# Patient Record
Sex: FEMALE | NOT HISPANIC OR LATINO | Employment: FULL TIME | URBAN - METROPOLITAN AREA
[De-identification: names, ages, dates, MRNs, and addresses within clinical notes are randomized per-mention and may not be internally consistent; named-entity substitution may affect disease eponyms.]

---

## 2018-03-19 ENCOUNTER — OFFICE VISIT (OUTPATIENT)
Dept: FAMILY MEDICINE CLINIC | Facility: CLINIC | Age: 28
End: 2018-03-19

## 2018-03-19 VITALS
OXYGEN SATURATION: 97 % | SYSTOLIC BLOOD PRESSURE: 136 MMHG | TEMPERATURE: 98.4 F | HEART RATE: 85 BPM | WEIGHT: 198 LBS | DIASTOLIC BLOOD PRESSURE: 70 MMHG

## 2018-03-19 DIAGNOSIS — F32.2 SEVERE SINGLE CURRENT EPISODE OF MAJOR DEPRESSIVE DISORDER, WITHOUT PSYCHOTIC FEATURES (HCC): Primary | ICD-10-CM

## 2018-03-19 DIAGNOSIS — R53.83 OTHER FATIGUE: ICD-10-CM

## 2018-03-19 DIAGNOSIS — N39.46 MIXED STRESS AND URGE URINARY INCONTINENCE: ICD-10-CM

## 2018-03-19 LAB
HCT VFR BLDA CALC: 39.7 %
HGB BLDA-MCNC: 13.4 G/DL
MCH, POC: 31.1
MCHC, POC: 33.8
MCV, POC: 91.9
PLATELET # BLD AUTO: 241 10*3/MM3
PMV BLD: 7.8 FL
RBC, POC: 4.32
RDW, POC: 12.4
WBC # BLD: 11 10*3/UL

## 2018-03-19 PROCEDURE — 85027 COMPLETE CBC AUTOMATED: CPT | Performed by: FAMILY MEDICINE

## 2018-03-19 PROCEDURE — 99203 OFFICE O/P NEW LOW 30 MIN: CPT | Performed by: FAMILY MEDICINE

## 2018-03-19 RX ORDER — BUPROPION HYDROCHLORIDE 150 MG/1
150 TABLET ORAL DAILY
Qty: 30 TABLET | Refills: 5 | Status: SHIPPED | OUTPATIENT
Start: 2018-03-19 | End: 2018-09-07

## 2018-03-19 RX ORDER — NORGESTIMATE AND ETHINYL ESTRADIOL 7DAYSX3 LO
1 KIT ORAL DAILY
Qty: 28 TABLET | Refills: 12
Start: 2018-03-19 | End: 2018-10-17 | Stop reason: SDUPTHER

## 2018-03-19 RX ORDER — OXYBUTYNIN CHLORIDE 5 MG/1
5 TABLET, EXTENDED RELEASE ORAL DAILY
Qty: 30 TABLET | Refills: 5 | Status: SHIPPED | OUTPATIENT
Start: 2018-03-19 | End: 2018-09-07

## 2018-03-19 NOTE — PROGRESS NOTES
Subjective   Crystal Anthony is a 27 y.o. female. Presents today for   Chief Complaint   Patient presents with   • Establish Care     Having some depression issues and a weak bladder.  Has a low sex drive.  Has some whelping from blood pressure cuff.  Gets alot of ear infections.   New patient here to establish care.    Difficulty Urinating   This is a chronic problem. The current episode started more than 1 year ago. The problem occurs intermittently. The problem has been unchanged. Associated symptoms include fatigue and urinary symptoms. Pertinent negatives include no fever. Associated symptoms comments: Reports had a lot of UTIs when young;  Has cut out soda;  +coffee;  Reports has leakage bending over, cough, sneeze usually;  Occly has urge to go, but cannot quite make it in time.. Exacerbated by: cough, sneeze; Treatments tried: Has been doing kegel exercises for quite sometime, no relief. The treatment provided no relief.   Depression   Visit Type: initial  Onset of symptoms: more than 1 year ago  Progression since onset: gradually worsening  Patient presents with the following symptoms: anhedonia, depressed mood, excessive worry, feelings of hopelessness, feelings of worthlessness, impotence, irritability, memory impairment, panic, shortness of breath and thoughts of death.  Patient is not experiencing: chest pain, decreased concentration, hypersomnia, insomnia, muscle tension, suicidal ideas and suicidal planning.  Frequency of symptoms: constantly   Sleep quality: fair  Nighttime awakenings: none  Patient has a history of: depression  Treatment tried: SSRI (during and after pregnancy)  Compliance with treatment: good  Improvement on treatment: no relief  Compliance problems: no issues with, provider left practice and so off (was gyn).          Review of Systems   Constitutional: Positive for fatigue and irritability. Negative for fever.   Respiratory: Positive for shortness of breath.    Genitourinary:  Positive for difficulty urinating and impotence.   Psychiatric/Behavioral: Negative for decreased concentration and suicidal ideas. The patient does not have insomnia.        The following portions of the patient's history were reviewed and updated as appropriate: allergies, current medications, past family history, past medical history, past social history, past surgical history and problem list.    There is no problem list on file for this patient.  History reviewed. No pertinent past medical history.  Past Surgical History:   Procedure Laterality Date   • DILATATION AND CURETTAGE       Family History   Problem Relation Age of Onset   • Colon cancer Maternal Grandfather    • Diabetes type II Paternal Grandfather    • Diabetes type II Other      Great grandma maternal     Social History     Social History   • Marital status:      Social History Main Topics   • Smoking status: Never Smoker   • Smokeless tobacco: Never Used   • Alcohol use Yes      Comment: weekends   • Drug use: Unknown     Other Topics Concern   • Not on file         No Known Allergies    No current outpatient prescriptions on file prior to visit.     No current facility-administered medications on file prior to visit.        Objective   Vitals:    03/19/18 1626   BP: 136/70   Pulse: 85   Temp: 98.4 °F (36.9 °C)   SpO2: 97%   Weight: 89.8 kg (198 lb)       Physical Exam   Constitutional: She appears well-developed and well-nourished.   HENT:   Head: Normocephalic and atraumatic.   Neck: Neck supple. No JVD present. No thyromegaly present.   Cardiovascular: Normal rate, regular rhythm and normal heart sounds.  Exam reveals no gallop and no friction rub.    No murmur heard.  Pulmonary/Chest: Effort normal and breath sounds normal. No respiratory distress. She has no wheezes. She has no rales.   Abdominal: Soft. Bowel sounds are normal. She exhibits no distension. There is no tenderness. There is no rebound and no guarding.   Musculoskeletal:  She exhibits no edema.   Neurological: She is alert.   Skin: Skin is warm and dry.   Psychiatric: Her behavior is normal. She exhibits a depressed mood.   Nursing note and vitals reviewed.  CBC ordered and reviewed.    POC CBC   Order: [id number]   Status:  Final result   Visible to patient:  No (Not Released) Dx:  Other fatigue    Ref Range & Units 17:22   Hematocrit % 39.7    Hemoglobin g/dL 13.4    RBC  4.32    WBC  11.0    MCV  91.9    MCH  31.1    MCHC  33.8    RDW-CV  12.4    Platelets 10*3/mm3 241    MPV  7.8    Resulting Agency  [facility]      Specimen Collected: 03/19/18 17:22 Last Resulted: 03/19/18 17:22                Assessment/Plan   Crystal was seen today for establish care.    Diagnoses and all orders for this visit:    Severe single current episode of major depressive disorder, without psychotic features  -     buPROPion XL (WELLBUTRIN XL) 150 MG 24 hr tablet; Take 1 tablet by mouth Daily.    Mixed stress and urge urinary incontinence  -     Ambulatory Referral to Urology  -     oxybutynin XL (DITROPAN XL) 5 MG 24 hr tablet; Take 1 tablet by mouth Daily.    Other fatigue  -     buPROPion XL (WELLBUTRIN XL) 150 MG 24 hr tablet; Take 1 tablet by mouth Daily.  -     Comprehensive Metabolic Panel  -     T3, Free  -     T4, Free  -     TSH  -     Vitamin B12  -     POC CBC    Other orders  -     norgestimate-ethinyl estradiol (ORTHO TRI-CYCLEN LO) 0.18/0.215/0.25 MG-25 MCG per tablet; Take 1 tablet by mouth Daily.    -mixed urinary incontinence, encouraged to continue kegel exercises;  Suggested PT referral, but declines at this time.  Would like to see urology and will make referral to evaluate further.  In interim will start ditropan xl low dose as trial;  D/w may help with urge, but not necessarily stress incontinence.  -patient depressed, d/w options and will try wellbutrin as weight neutral and less likely libido side effects.  Sertraline did not put in remission.  D/w  trintellix and could consider adding as well.  -fatigue - will check labs as above.         -Follow up: 6 weeks     No current outpatient prescriptions on file.

## 2018-03-20 DIAGNOSIS — E03.9 HYPOTHYROIDISM, UNSPECIFIED TYPE: Primary | ICD-10-CM

## 2018-03-20 DIAGNOSIS — E03.8 OTHER SPECIFIED HYPOTHYROIDISM: Primary | ICD-10-CM

## 2018-03-20 DIAGNOSIS — R53.83 OTHER FATIGUE: ICD-10-CM

## 2018-03-20 LAB
ALBUMIN SERPL-MCNC: 4.2 G/DL (ref 3.5–5.5)
ALBUMIN/GLOB SERPL: 1.4 {RATIO} (ref 1.2–2.2)
ALP SERPL-CCNC: 53 IU/L (ref 39–117)
ALT SERPL-CCNC: 16 IU/L (ref 0–32)
AST SERPL-CCNC: 15 IU/L (ref 0–40)
BILIRUB SERPL-MCNC: <0.2 MG/DL (ref 0–1.2)
BUN SERPL-MCNC: 17 MG/DL (ref 6–20)
BUN/CREAT SERPL: 19 (ref 9–23)
CALCIUM SERPL-MCNC: 9.5 MG/DL (ref 8.7–10.2)
CHLORIDE SERPL-SCNC: 105 MMOL/L (ref 96–106)
CO2 SERPL-SCNC: 20 MMOL/L (ref 18–29)
CREAT SERPL-MCNC: 0.91 MG/DL (ref 0.57–1)
GFR SERPLBLD CREATININE-BSD FMLA CKD-EPI: 86 ML/MIN/1.73
GFR SERPLBLD CREATININE-BSD FMLA CKD-EPI: 99 ML/MIN/1.73
GLOBULIN SER CALC-MCNC: 3.1 G/DL (ref 1.5–4.5)
GLUCOSE SERPL-MCNC: 97 MG/DL (ref 65–99)
POTASSIUM SERPL-SCNC: 4.3 MMOL/L (ref 3.5–5.2)
PROT SERPL-MCNC: 7.3 G/DL (ref 6–8.5)
SODIUM SERPL-SCNC: 140 MMOL/L (ref 134–144)
T3FREE SERPL-MCNC: 3 PG/ML (ref 2–4.4)
T4 FREE SERPL-MCNC: 0.98 NG/DL (ref 0.82–1.77)
TSH SERPL DL<=0.005 MIU/L-ACNC: 6.5 UIU/ML (ref 0.45–4.5)
VIT B12 SERPL-MCNC: 431 PG/ML (ref 232–1245)

## 2018-03-20 NOTE — PROGRESS NOTES
Call results to patient.  Patient wbc is mildly elevated, will need to recheck  Thyroid hormone levels are normal;  TSH suggests slightly underactive.  This range usually don't treat, just monitor.  Given her not feeling well, I would suggest further evaluation with u/s of thyroid, repeat labs in 4 weeks (with recheck CBC) and orders placed (have done before next visit so can review.  B12 ok  Kidney and liver function normal

## 2018-03-27 ENCOUNTER — HOSPITAL ENCOUNTER (OUTPATIENT)
Dept: ULTRASOUND IMAGING | Facility: HOSPITAL | Age: 28
Discharge: HOME | End: 2018-03-27
Admitting: FAMILY MEDICINE

## 2018-03-27 PROCEDURE — 76536 US EXAM OF HEAD AND NECK: CPT

## 2018-05-06 LAB
BASOPHILS # BLD AUTO: 0 X10E3/UL (ref 0–0.2)
BASOPHILS NFR BLD AUTO: 0 %
EOSINOPHIL # BLD AUTO: 0.3 X10E3/UL (ref 0–0.4)
EOSINOPHIL NFR BLD AUTO: 2 %
ERYTHROCYTE [DISTWIDTH] IN BLOOD BY AUTOMATED COUNT: 12.8 % (ref 12.3–15.4)
HCT VFR BLD AUTO: 39.9 % (ref 34–46.6)
HGB BLD-MCNC: 13.5 G/DL (ref 11.1–15.9)
IMM GRANULOCYTES # BLD: 0 X10E3/UL (ref 0–0.1)
IMM GRANULOCYTES NFR BLD: 0 %
LYMPHOCYTES # BLD AUTO: 2.4 X10E3/UL (ref 0.7–3.1)
LYMPHOCYTES NFR BLD AUTO: 21 %
MCH RBC QN AUTO: 30.8 PG (ref 26.6–33)
MCHC RBC AUTO-ENTMCNC: 33.8 G/DL (ref 31.5–35.7)
MCV RBC AUTO: 91 FL (ref 79–97)
MONOCYTES # BLD AUTO: 0.8 X10E3/UL (ref 0.1–0.9)
MONOCYTES NFR BLD AUTO: 8 %
NEUTROPHILS # BLD AUTO: 7.6 X10E3/UL (ref 1.4–7)
NEUTROPHILS NFR BLD AUTO: 69 %
PATH INTERP BLD-IMP: ABNORMAL
PATH REV BLD -IMP: ABNORMAL
PATHOLOGIST NAME: ABNORMAL
PLATELET # BLD AUTO: 353 X10E3/UL (ref 150–379)
RBC # BLD AUTO: 4.38 X10E6/UL (ref 3.77–5.28)
T3FREE SERPL-MCNC: 3.1 PG/ML (ref 2–4.4)
T4 FREE SERPL-MCNC: 1.09 NG/DL (ref 0.82–1.77)
THYROGLOB AB SERPL-ACNC: 154.1 IU/ML (ref 0–0.9)
THYROGLOB SERPL-MCNC: 29 NG/ML
THYROPEROXIDASE AB SERPL-ACNC: >600 IU/ML (ref 0–34)
TSH SERPL DL<=0.005 MIU/L-ACNC: 7.66 UIU/ML (ref 0.45–4.5)
WBC # BLD AUTO: 11.2 X10E3/UL (ref 3.4–10.8)

## 2018-05-07 ENCOUNTER — OFFICE VISIT (OUTPATIENT)
Dept: FAMILY MEDICINE CLINIC | Facility: CLINIC | Age: 28
End: 2018-05-07

## 2018-05-07 VITALS
HEART RATE: 71 BPM | TEMPERATURE: 98.3 F | HEIGHT: 64 IN | DIASTOLIC BLOOD PRESSURE: 78 MMHG | OXYGEN SATURATION: 98 % | WEIGHT: 196 LBS | BODY MASS INDEX: 33.46 KG/M2 | SYSTOLIC BLOOD PRESSURE: 104 MMHG

## 2018-05-07 DIAGNOSIS — E06.3 HASHIMOTO'S THYROIDITIS: Primary | ICD-10-CM

## 2018-05-07 DIAGNOSIS — J30.1 ACUTE SEASONAL ALLERGIC RHINITIS DUE TO POLLEN: ICD-10-CM

## 2018-05-07 DIAGNOSIS — F32.1 MODERATE SINGLE CURRENT EPISODE OF MAJOR DEPRESSIVE DISORDER (HCC): ICD-10-CM

## 2018-05-07 DIAGNOSIS — M22.8X1 PATELLAR TRACKING DISORDER OF RIGHT KNEE: ICD-10-CM

## 2018-05-07 PROCEDURE — 99214 OFFICE O/P EST MOD 30 MIN: CPT | Performed by: FAMILY MEDICINE

## 2018-05-07 RX ORDER — LEVOTHYROXINE SODIUM 0.05 MG/1
50 TABLET ORAL DAILY
Qty: 30 TABLET | Refills: 2 | Status: SHIPPED | OUTPATIENT
Start: 2018-05-07 | End: 2018-09-07 | Stop reason: SDUPTHER

## 2018-05-07 RX ORDER — LEVOTHYROXINE SODIUM 0.05 MG/1
50 TABLET ORAL DAILY
Qty: 30 TABLET | Refills: 1 | Status: SHIPPED | OUTPATIENT
Start: 2018-05-07 | End: 2020-06-05

## 2018-05-07 RX ORDER — FEXOFENADINE HCL AND PSEUDOEPHEDRINE HCI 180; 240 MG/1; MG/1
1 TABLET, EXTENDED RELEASE ORAL DAILY
Qty: 30 TABLET | Refills: 4 | Status: SHIPPED | OUTPATIENT
Start: 2018-05-07 | End: 2018-10-17

## 2018-05-07 NOTE — PROGRESS NOTES
Subjective   Crystal Anthony is a 27 y.o. female. Presents today for   Chief Complaint   Patient presents with   • Follow-up     pt here for 6 week follow up visit   • Allergies     pt has a lot of drainage right now.       Hypothyroidism   This is a new problem. The current episode started 1 to 4 weeks ago. The problem occurs constantly. The problem has been unchanged. Associated symptoms include congestion, coughing and fatigue. Pertinent negatives include no abdominal pain, arthralgias, change in bowel habit, chest pain, chills, headaches, nausea, sore throat or vomiting. Exacerbated by: PATIENT HAD LABS NOTES ELEVATED TSH (RISING) WITH +ANTIBODIES.  U/S NOTES HETERGENOUSE THYROID, BUT NO DISCRETE NODULES. She has tried nothing (NEW DX;   REPORTS FAMILY HX OF THYROID DISORDER) for the symptoms. The treatment provided no relief.   Depression   Visit Type: follow-up (LAST OV STARTED WELLBUTRIN, REPORTS NO CHANGE IN MOOD.)  Patient presents with the following symptoms: depressed mood, feelings of worthlessness and weight gain (IS DOWN 2 LBS FROM LAST OV HOWEVER.).  Patient is not experiencing: shortness of breath, suicidal ideas, suicidal planning and thoughts of death.  Frequency of symptoms: most days   Severity: moderate   Sleep quality: fair  Compliance with medications:  %  Treatment side effects: NO SIDE EFFECTS REPORTED.  Sinusitis   This is a recurrent problem. The current episode started 1 to 4 weeks ago. The problem has been waxing and waning since onset. There has been no fever. She is experiencing no pain. Associated symptoms include congestion, coughing and sneezing. Pertinent negatives include no chills, ear pain, headaches, shortness of breath, sinus pressure or sore throat. Past treatments include nothing. The treatment provided no relief.   Knee Pain    The incident occurred more than 1 week ago. The incident occurred at home. There was no injury mechanism. The quality of the pain is described  "as aching. The pain is mild. The pain has been intermittent since onset. The symptoms are aggravated by weight bearing (STAIRS). She has tried nothing for the symptoms. The treatment provided no relief.       Review of Systems   Constitutional: Positive for fatigue and weight gain (IS DOWN 2 LBS FROM LAST OV HOWEVER.). Negative for chills.   HENT: Positive for congestion and sneezing. Negative for ear pain, sinus pressure and sore throat.    Respiratory: Positive for cough. Negative for shortness of breath.    Cardiovascular: Negative for chest pain.   Gastrointestinal: Negative for abdominal pain, change in bowel habit, nausea and vomiting.   Musculoskeletal: Negative for arthralgias.   Neurological: Negative for headaches.   Psychiatric/Behavioral: Negative for suicidal ideas.       The following portions of the patient's history were reviewed and updated as appropriate: allergies, current medications, past family history, past medical history and problem list.    There is no problem list on file for this patient.      No Known Allergies    Current Outpatient Prescriptions on File Prior to Visit   Medication Sig Dispense Refill   • buPROPion XL (WELLBUTRIN XL) 150 MG 24 hr tablet Take 1 tablet by mouth Daily. 30 tablet 5   • norgestimate-ethinyl estradiol (ORTHO TRI-CYCLEN LO) 0.18/0.215/0.25 MG-25 MCG per tablet Take 1 tablet by mouth Daily. 28 tablet 12   • oxybutynin XL (DITROPAN XL) 5 MG 24 hr tablet Take 1 tablet by mouth Daily. 30 tablet 5     No current facility-administered medications on file prior to visit.        Objective   Vitals:    05/07/18 1716   BP: 104/78   BP Location: Left arm   Patient Position: Sitting   Cuff Size: Large Adult   Pulse: 71   Temp: 98.3 °F (36.8 °C)   TempSrc: Oral   SpO2: 98%   Weight: 88.9 kg (196 lb)   Height: 161.9 cm (63.75\")       Physical Exam   Constitutional: She appears well-developed and well-nourished.   HENT:   Head: Normocephalic and atraumatic.   Right Ear: " Tympanic membrane normal.   Left Ear: Tympanic membrane normal.   Nose: Mucosal edema and rhinorrhea present.   Mouth/Throat: Uvula is midline, oropharynx is clear and moist and mucous membranes are normal.   Neck: Neck supple. No JVD present. No thyromegaly present.   Cardiovascular: Normal rate, regular rhythm and normal heart sounds.  Exam reveals no gallop and no friction rub.    No murmur heard.  Pulmonary/Chest: Effort normal and breath sounds normal. No respiratory distress. She has no wheezes. She has no rales.   Abdominal: Soft. Bowel sounds are normal. She exhibits no distension. There is no tenderness. There is no rebound and no guarding.   Musculoskeletal: She exhibits no edema.        Right knee: She exhibits abnormal patellar mobility (lateral tracking and crepitus.). She exhibits normal range of motion, no swelling, no effusion, no ecchymosis, no deformity, no laceration, no erythema, normal alignment, no LCL laxity, no bony tenderness, normal meniscus and no MCL laxity. Tenderness found. No medial joint line, no lateral joint line, no MCL, no LCL and no patellar tendon tenderness noted.   Neurological: She is alert.   Skin: Skin is warm and dry.   Psychiatric: She has a normal mood and affect. Her behavior is normal.   Nursing note and vitals reviewed.      Assessment/Plan   Crystal was seen today for follow-up and allergies.    Diagnoses and all orders for this visit:    Hashimoto's thyroiditis  -     levothyroxine (SYNTHROID) 50 MCG tablet; Take 1 tablet by mouth Daily.  -     TSH; Standing    Patellar tracking disorder of right knee    Acute seasonal allergic rhinitis due to pollen  -     fexofenadine-pseudoephedrine (ALLEGRA-D ALLERGY & CONGESTION) 180-240 MG per 24 hr tablet; Take 1 tablet by mouth Daily.    Moderate single current episode of major depressive disorder    -I d/w at length pastophysiology of hashimotos thyroiditis and +/- tx at this time.  Will have to check thyroid levels, but  wait for 5 to 6 weeks to adjust dose to prevent over replacement.  Consider referral.  I also d/w possible cause of mood changes.  Will continue wellbutrin for mood only for now, but consider change if not doing well at f/u.  -allergies - will try antihistamine with decongestant;  Nasal saline rinses prn  -knee pain - suspect lateral tracking, d/w pathophysiology and typical treatment;  Declines PT referral;  I gave handout on exercises to improve.  OTC nsaid prn pain.         -Follow up: 3 months       Current Outpatient Prescriptions:   •  buPROPion XL (WELLBUTRIN XL) 150 MG 24 hr tablet, Take 1 tablet by mouth Daily., Disp: 30 tablet, Rfl: 5  •  levothyroxine (SYNTHROID) 50 MCG tablet, Take 1 tablet by mouth Daily., Disp: 30 tablet, Rfl: 1  •  norgestimate-ethinyl estradiol (ORTHO TRI-CYCLEN LO) 0.18/0.215/0.25 MG-25 MCG per tablet, Take 1 tablet by mouth Daily., Disp: 28 tablet, Rfl: 12  •  oxybutynin XL (DITROPAN XL) 5 MG 24 hr tablet, Take 1 tablet by mouth Daily., Disp: 30 tablet, Rfl: 5  •  fexofenadine-pseudoephedrine (ALLEGRA-D ALLERGY & CONGESTION) 180-240 MG per 24 hr tablet, Take 1 tablet by mouth Daily., Disp: 30 tablet, Rfl: 4  •  levothyroxine (SYNTHROID) 50 MCG tablet, Take 1 tablet by mouth Daily., Disp: 30 tablet, Rfl: 2

## 2018-05-29 ENCOUNTER — TELEPHONE (OUTPATIENT)
Dept: FAMILY MEDICINE CLINIC | Facility: CLINIC | Age: 28
End: 2018-05-29

## 2018-05-29 RX ORDER — METHYLPREDNISOLONE 4 MG/1
TABLET ORAL
Qty: 21 TABLET | Refills: 1 | Status: SHIPPED | OUTPATIENT
Start: 2018-05-29 | End: 2018-09-07

## 2018-06-01 ENCOUNTER — RESULTS ENCOUNTER (OUTPATIENT)
Dept: FAMILY MEDICINE CLINIC | Facility: CLINIC | Age: 28
End: 2018-06-01

## 2018-06-01 DIAGNOSIS — E06.3 HASHIMOTO'S THYROIDITIS: ICD-10-CM

## 2018-06-08 LAB — TSH SERPL DL<=0.005 MIU/L-ACNC: 2.72 MIU/ML (ref 0.27–4.2)

## 2018-06-29 ENCOUNTER — RESULTS ENCOUNTER (OUTPATIENT)
Dept: FAMILY MEDICINE CLINIC | Facility: CLINIC | Age: 28
End: 2018-06-29

## 2018-06-29 DIAGNOSIS — E06.3 HASHIMOTO'S THYROIDITIS: ICD-10-CM

## 2018-09-07 ENCOUNTER — OFFICE VISIT (OUTPATIENT)
Dept: FAMILY MEDICINE CLINIC | Facility: CLINIC | Age: 28
End: 2018-09-07

## 2018-09-07 VITALS
TEMPERATURE: 98.4 F | HEART RATE: 75 BPM | OXYGEN SATURATION: 100 % | SYSTOLIC BLOOD PRESSURE: 132 MMHG | BODY MASS INDEX: 32.95 KG/M2 | DIASTOLIC BLOOD PRESSURE: 90 MMHG | WEIGHT: 193 LBS | HEIGHT: 64 IN

## 2018-09-07 DIAGNOSIS — R10.84 ABDOMINAL PAIN, ACUTE, GENERALIZED: Primary | ICD-10-CM

## 2018-09-07 DIAGNOSIS — R11.2 NAUSEA AND VOMITING, INTRACTABILITY OF VOMITING NOT SPECIFIED, UNSPECIFIED VOMITING TYPE: ICD-10-CM

## 2018-09-07 PROCEDURE — 99213 OFFICE O/P EST LOW 20 MIN: CPT | Performed by: NURSE PRACTITIONER

## 2018-09-07 RX ORDER — ONDANSETRON 4 MG/1
4 TABLET, ORALLY DISINTEGRATING ORAL EVERY 8 HOURS PRN
Qty: 25 TABLET | Refills: 0 | Status: SHIPPED | OUTPATIENT
Start: 2018-09-07 | End: 2018-10-17

## 2018-09-07 RX ORDER — OMEPRAZOLE 20 MG/1
20 CAPSULE, DELAYED RELEASE ORAL DAILY
Qty: 30 CAPSULE | Refills: 1 | Status: SHIPPED | OUTPATIENT
Start: 2018-09-07 | End: 2018-09-12 | Stop reason: SDUPTHER

## 2018-09-07 NOTE — PROGRESS NOTES
Subjective   Crystal Anthony is a 27 y.o. female.  Patient has had nausea, vomiting and loose stools for 4 days. She did eat fish for the first time on Sat but other than that, nothing new in diet. Does eat out occasionally but mostly cooks/eats at home. She started with some abdominal discomfort with nausea. Tried to eat something light, but immediately feels very nauseated and then vomits. The pain is generalized and cramping in nature. She does have some loose stools but doesn't really feel like it's diarrhea.    Nausea   This is a new problem. The current episode started in the past 7 days. The problem occurs constantly. The problem has been gradually worsening. Associated symptoms include abdominal pain, nausea and vomiting. The symptoms are aggravated by eating and drinking. Treatments tried: TUMS. The treatment provided no relief.   Vomiting    This is a new problem. The current episode started in the past 7 days. The problem occurs less than 2 times per day. The problem has been gradually worsening. There has been no fever. Associated symptoms include abdominal pain. Treatments tried: Tums. The treatment provided no relief.   Abdominal Pain   This is a new problem. The current episode started in the past 7 days. The onset quality is sudden. The problem occurs constantly. The most recent episode lasted 4 days. The problem has been gradually worsening. The pain is located in the generalized abdominal region. The quality of the pain is cramping. Associated symptoms include nausea and vomiting. The pain is aggravated by eating. The pain is relieved by nothing. She has tried antacids for the symptoms. The treatment provided no relief.   The following portions of the patient's history were reviewed and updated as appropriate: allergies, current medications, past family history, past medical history, past social history, past surgical history and problem list.    Review of Systems   Constitutional: Negative.   "  Respiratory: Negative.    Cardiovascular: Negative.    Gastrointestinal: Positive for abdominal pain, nausea and vomiting.       Objective   Physical Exam   Constitutional: Vital signs are normal. She appears well-developed and well-nourished. She is cooperative.   Cardiovascular: Normal rate and regular rhythm.    Pulmonary/Chest: Effort normal and breath sounds normal.   Abdominal: Soft. Normal appearance and bowel sounds are normal. There is no hepatosplenomegaly. There is generalized tenderness. There is no rigidity, no rebound and no guarding.       Neurological: She is alert.   Nursing note and vitals reviewed.    Vitals:    09/07/18 1303   BP: 132/90   BP Location: Right arm   Patient Position: Sitting   Cuff Size: Large Adult   Pulse: 75   Temp: 98.4 °F (36.9 °C)   TempSrc: Oral   SpO2: 100%   Weight: 87.5 kg (193 lb)   Height: 161.9 cm (63.74\")       Assessment/Plan   Crystal was seen today for illness.    Diagnoses and all orders for this visit:    Abdominal pain, acute, generalized  -     TSH  -     Hepatitis Panel, Acute  -     CBC Auto Differential  -     Comprehensive Metabolic Panel  -     Lipase  -     Amylase  -     ondansetron ODT (ZOFRAN ODT) 4 MG disintegrating tablet; Take 1 tablet by mouth Every 8 (Eight) Hours As Needed for Nausea or Vomiting.  -     omeprazole (priLOSEC) 20 MG capsule; Take 1 capsule by mouth Daily.    Nausea and vomiting, intractability of vomiting not specified, unspecified vomiting type  -     ondansetron ODT (ZOFRAN ODT) 4 MG disintegrating tablet; Take 1 tablet by mouth Every 8 (Eight) Hours As Needed for Nausea or Vomiting.    Consider hepatitis A, ? PUD, gallbladder. Will get some labs and call results on Monday  Advised Zofran and the start omeprazole  If can't hold down liquids, then consider Urgent care or ED.             "

## 2018-09-08 LAB
ALBUMIN SERPL-MCNC: 4.4 G/DL (ref 3.5–5.5)
ALBUMIN/GLOB SERPL: 1.9 {RATIO} (ref 1.2–2.2)
ALP SERPL-CCNC: 55 IU/L (ref 39–117)
ALT SERPL-CCNC: 16 IU/L (ref 0–32)
AMYLASE SERPL-CCNC: 39 U/L (ref 31–124)
AST SERPL-CCNC: 16 IU/L (ref 0–40)
BASOPHILS # BLD AUTO: 0 X10E3/UL (ref 0–0.2)
BASOPHILS NFR BLD AUTO: 0 %
BILIRUB SERPL-MCNC: <0.2 MG/DL (ref 0–1.2)
BUN SERPL-MCNC: 11 MG/DL (ref 6–20)
BUN/CREAT SERPL: 15 (ref 9–23)
CALCIUM SERPL-MCNC: 9 MG/DL (ref 8.7–10.2)
CHLORIDE SERPL-SCNC: 104 MMOL/L (ref 96–106)
CO2 SERPL-SCNC: 21 MMOL/L (ref 20–29)
CREAT SERPL-MCNC: 0.73 MG/DL (ref 0.57–1)
EOSINOPHIL # BLD AUTO: 0.3 X10E3/UL (ref 0–0.4)
EOSINOPHIL NFR BLD AUTO: 3 %
ERYTHROCYTE [DISTWIDTH] IN BLOOD BY AUTOMATED COUNT: 12.9 % (ref 12.3–15.4)
GLOBULIN SER CALC-MCNC: 2.3 G/DL (ref 1.5–4.5)
GLUCOSE SERPL-MCNC: 71 MG/DL (ref 65–99)
HAV IGM SERPL QL IA: NEGATIVE
HBV CORE IGM SERPL QL IA: NEGATIVE
HBV SURFACE AG SERPL QL IA: NEGATIVE
HCT VFR BLD AUTO: 36.6 % (ref 34–46.6)
HCV AB S/CO SERPL IA: <0.1 S/CO RATIO (ref 0–0.9)
HGB BLD-MCNC: 12.2 G/DL (ref 11.1–15.9)
IMM GRANULOCYTES # BLD: 0 X10E3/UL (ref 0–0.1)
IMM GRANULOCYTES NFR BLD: 0 %
LIPASE SERPL-CCNC: 20 U/L (ref 14–72)
LYMPHOCYTES # BLD AUTO: 2 X10E3/UL (ref 0.7–3.1)
LYMPHOCYTES NFR BLD AUTO: 20 %
MCH RBC QN AUTO: 31 PG (ref 26.6–33)
MCHC RBC AUTO-ENTMCNC: 33.3 G/DL (ref 31.5–35.7)
MCV RBC AUTO: 93 FL (ref 79–97)
MONOCYTES # BLD AUTO: 0.8 X10E3/UL (ref 0.1–0.9)
MONOCYTES NFR BLD AUTO: 8 %
NEUTROPHILS # BLD AUTO: 7.1 X10E3/UL (ref 1.4–7)
NEUTROPHILS NFR BLD AUTO: 69 %
PLATELET # BLD AUTO: 295 X10E3/UL (ref 150–379)
POTASSIUM SERPL-SCNC: 4.4 MMOL/L (ref 3.5–5.2)
PROT SERPL-MCNC: 6.7 G/DL (ref 6–8.5)
RBC # BLD AUTO: 3.94 X10E6/UL (ref 3.77–5.28)
SODIUM SERPL-SCNC: 139 MMOL/L (ref 134–144)
TSH SERPL DL<=0.005 MIU/L-ACNC: 4.59 UIU/ML (ref 0.45–4.5)
WBC # BLD AUTO: 10.3 X10E3/UL (ref 3.4–10.8)

## 2018-09-12 ENCOUNTER — OFFICE VISIT (OUTPATIENT)
Dept: FAMILY MEDICINE CLINIC | Facility: CLINIC | Age: 28
End: 2018-09-12

## 2018-09-12 ENCOUNTER — TELEPHONE (OUTPATIENT)
Dept: FAMILY MEDICINE CLINIC | Facility: CLINIC | Age: 28
End: 2018-09-12

## 2018-09-12 VITALS
WEIGHT: 196 LBS | SYSTOLIC BLOOD PRESSURE: 128 MMHG | HEART RATE: 90 BPM | TEMPERATURE: 98.7 F | BODY MASS INDEX: 33.46 KG/M2 | DIASTOLIC BLOOD PRESSURE: 74 MMHG | OXYGEN SATURATION: 99 % | HEIGHT: 64 IN

## 2018-09-12 DIAGNOSIS — R10.84 ABDOMINAL PAIN, ACUTE, GENERALIZED: ICD-10-CM

## 2018-09-12 DIAGNOSIS — R10.84 GENERALIZED ABDOMINAL PAIN: Primary | ICD-10-CM

## 2018-09-12 PROCEDURE — 99213 OFFICE O/P EST LOW 20 MIN: CPT | Performed by: NURSE PRACTITIONER

## 2018-09-12 RX ORDER — OMEPRAZOLE 20 MG/1
20 CAPSULE, DELAYED RELEASE ORAL 2 TIMES DAILY
Qty: 60 CAPSULE | Refills: 1 | Status: SHIPPED | OUTPATIENT
Start: 2018-09-12 | End: 2019-02-22

## 2018-09-12 RX ORDER — AMOXICILLIN 500 MG/1
1000 CAPSULE ORAL 2 TIMES DAILY
Qty: 20 CAPSULE | Refills: 0 | Status: SHIPPED | OUTPATIENT
Start: 2018-09-12 | End: 2018-10-17

## 2018-09-12 NOTE — PROGRESS NOTES
"Subjective   Crystal Anthony is a 27 y.o. female who presents c/o abdominal pain, especially after eating. Was seen for similar 9/7/18.     History of Present Illness   Pain worse after eating, gets shooting pains throughout stomach. Not colicky, doesn't matter what eats, gets the pain. Gets nauseated with the pain. Omeprazole has helped indigestion, but not the abdominal pain. Antacid is helping with the pain. No vomiting or loose stools in the last few days except when pain gets severe, only thing to relieve it is to vomit. Mom thinks its an ulcer. Has not eaten at all today. Pain more tolerable today.  The following portions of the patient's history were reviewed and updated as appropriate: allergies, current medications, past family history, past medical history, past social history, past surgical history and problem list.    Review of Systems   Constitutional: Negative for chills and fever.   Respiratory: Negative.    Cardiovascular: Negative.    Gastrointestinal: Positive for abdominal distention, abdominal pain, nausea, vomiting and indigestion. Negative for constipation and diarrhea.   Psychiatric/Behavioral: Negative.      /74   Pulse 90   Temp 98.7 °F (37.1 °C) (Oral)   Ht 161.9 cm (63.75\")   Wt 88.9 kg (196 lb)   LMP 08/28/2018 (Approximate)   SpO2 99%   BMI 33.91 kg/m²     Objective   Physical Exam   Constitutional: She appears well-developed and well-nourished. No distress.   Cardiovascular: Normal rate.    Pulmonary/Chest: Effort normal.   Abdominal: Soft. Normal appearance and bowel sounds are normal. She exhibits no distension and no ascites. There is generalized tenderness. There is no rigidity, no rebound, no guarding, no CVA tenderness and negative Spivey's sign.   Skin: Skin is warm and dry.   Psychiatric: She has a normal mood and affect. Her behavior is normal. Judgment and thought content normal.   Nursing note and vitals reviewed.    Assessment/Plan   Problems Addressed this Visit "     None      Visit Diagnoses     Generalized abdominal pain    -  Primary    Relevant Medications    amoxicillin (AMOXIL) 500 MG capsule    Other Relevant Orders    H. Pylori Breath Test - Breath, Lung    Abdominal pain, acute, generalized        Relevant Medications    omeprazole (priLOSEC) 20 MG capsule        Abdominal pain--suspect ulcer, will treat presumptive. If pain not settling 1-2 days, would consider abdominal US.  Increase PPI to BID for now.

## 2018-09-12 NOTE — TELEPHONE ENCOUNTER
----- Message from CARLOS Mohamud sent at 9/10/2018  9:48 AM EDT -----  Call the patient on the lab results. TSH is up a little but would not adjust meds yet. Recheck in 2 months. Hepatitis test were all negative. Kidney and liver function normal. Electrolytes normal. Is she feeling any better with the medications? If not, may need to follow up for recheck.

## 2018-09-13 LAB — UREA BREATH TEST QL: NEGATIVE

## 2018-09-14 NOTE — PROGRESS NOTES
Please call the patient regarding her result. h pylori test negative for ulcer bacteria. Set up abdominal US if pain not improved.

## 2018-09-18 ENCOUNTER — TELEPHONE (OUTPATIENT)
Dept: FAMILY MEDICINE CLINIC | Facility: CLINIC | Age: 28
End: 2018-09-18

## 2018-09-18 DIAGNOSIS — R10.84 GENERALIZED ABDOMINAL PAIN: Primary | ICD-10-CM

## 2018-09-18 NOTE — TELEPHONE ENCOUNTER
Patient states she is still having abd pain and wants to proceed with US. She prefers at [facility] during the day or after 3. Okay for order?

## 2018-10-17 ENCOUNTER — OFFICE VISIT (OUTPATIENT)
Dept: FAMILY MEDICINE CLINIC | Facility: CLINIC | Age: 28
End: 2018-10-17

## 2018-10-17 VITALS
OXYGEN SATURATION: 98 % | WEIGHT: 194 LBS | HEIGHT: 64 IN | DIASTOLIC BLOOD PRESSURE: 64 MMHG | BODY MASS INDEX: 33.12 KG/M2 | SYSTOLIC BLOOD PRESSURE: 118 MMHG | TEMPERATURE: 97.8 F | HEART RATE: 65 BPM

## 2018-10-17 DIAGNOSIS — Z12.4 ENCOUNTER FOR PAPANICOLAOU SMEAR OF CERVIX: Primary | ICD-10-CM

## 2018-10-17 DIAGNOSIS — Z30.09 BIRTH CONTROL COUNSELING: ICD-10-CM

## 2018-10-17 PROCEDURE — 99395 PREV VISIT EST AGE 18-39: CPT | Performed by: NURSE PRACTITIONER

## 2018-10-17 RX ORDER — NORGESTIMATE AND ETHINYL ESTRADIOL 7DAYSX3 LO
1 KIT ORAL DAILY
Qty: 28 TABLET | Refills: 12
Start: 2018-10-17 | End: 2019-09-05 | Stop reason: SDUPTHER

## 2018-10-17 RX ORDER — FEXOFENADINE HCL 180 MG/1
180 TABLET ORAL DAILY
COMMUNITY
End: 2021-03-02

## 2018-10-17 NOTE — PROGRESS NOTES
"Subjective   Crystal Anthony is a 28 y.o. female who presents for a well woman exam.    History of Present Illness   On ortho tricyclen low for birth control. Reports last pap in Oct of last year, was normal. No history of abnormal paps. , no history of STDs, not necessary to screen today for STDs.   No FH breast,cervical or ovarian cancers, no FH colon cancer. Periods normal on birth control. Thinking about IUD instead of the pills. Or nexplanon.   No current breast complaints, does do BSE  The following portions of the patient's history were reviewed and updated as appropriate: allergies, current medications, past family history, past medical history, past social history, past surgical history and problem list.    Review of Systems   Constitutional: Negative for activity change, unexpected weight gain and unexpected weight loss.   Genitourinary: Negative for breast discharge, genital sores, pelvic pain, vaginal bleeding, vaginal discharge, vaginal pain, breast lump and breast pain.   Psychiatric/Behavioral: Negative.      /64   Pulse 65   Temp 97.8 °F (36.6 °C) (Oral)   Ht 161.9 cm (63.75\")   Wt 88 kg (194 lb)   SpO2 98%   BMI 33.56 kg/m²     Objective   Physical Exam   Constitutional: She appears well-developed and well-nourished.   Abdominal: Soft. She exhibits no distension. There is no tenderness.   Genitourinary: Vagina normal, uterus normal and cervix normal. Pelvic exam was performed with patient supine. There is no rash, tenderness, lesion, injury or Bartholin's cyst on the right labia. There is no rash, tenderness, lesion, injury or Bartholin's cyst on the left labia. Uterus is midaxial.   Cervix is parous. Cervix does not exhibit friability. Right adnexum displays no mass, no tenderness and no fullness. Right adnexum is palpable.Left adnexum displays no mass, no tenderness and no fullness. Left adnexum is palpable.  Lymphadenopathy:        Right: No inguinal adenopathy present.        " Left: No inguinal adenopathy present.   Psychiatric: She has a normal mood and affect. Her behavior is normal. Judgment and thought content normal.   Nursing note and vitals reviewed.    Assessment/Plan   Problems Addressed this Visit     None      Visit Diagnoses     Encounter for Papanicolaou smear of cervix    -  Primary    Relevant Orders    Pap IG, Rfx HPV ASCU    Birth control counseling            Discussed options for birth control. Will refer to gyn if decides to pursue IUD or implanon. Desires to continue OCPs for now. No contraindications for use. FU annually.

## 2018-10-19 LAB
CONV .: NORMAL
CYTOLOGIST CVX/VAG CYTO: NORMAL
CYTOLOGY CVX/VAG DOC THIN PREP: NORMAL
DX ICD CODE: NORMAL
HIV 1 & 2 AB SER-IMP: NORMAL
OTHER STN SPEC: NORMAL
PATH REPORT.FINAL DX SPEC: NORMAL
STAT OF ADQ CVX/VAG CYTO-IMP: NORMAL

## 2019-02-22 ENCOUNTER — OFFICE VISIT (OUTPATIENT)
Dept: FAMILY MEDICINE CLINIC | Facility: CLINIC | Age: 29
End: 2019-02-22

## 2019-02-22 VITALS
TEMPERATURE: 99 F | OXYGEN SATURATION: 97 % | WEIGHT: 191 LBS | SYSTOLIC BLOOD PRESSURE: 118 MMHG | DIASTOLIC BLOOD PRESSURE: 62 MMHG | BODY MASS INDEX: 32.61 KG/M2 | HEIGHT: 64 IN | HEART RATE: 71 BPM

## 2019-02-22 DIAGNOSIS — J06.9 ACUTE URI: ICD-10-CM

## 2019-02-22 DIAGNOSIS — E06.3 HASHIMOTO'S THYROIDITIS: Primary | ICD-10-CM

## 2019-02-22 PROCEDURE — 99213 OFFICE O/P EST LOW 20 MIN: CPT | Performed by: NURSE PRACTITIONER

## 2019-02-22 NOTE — PROGRESS NOTES
"Thomas Anthony is a 28 y.o. female who presents for a routine follow up on hypothyroidism. Also with congestion and ear pain.     History of Present Illness   Thyroid symptoms include fatigue and depression, improved with medication, no issues with bowel changes.   Ear pain and cough x 3 days with ST, taking allegra daily, but not helping.   The following portions of the patient's history were reviewed and updated as appropriate: allergies, current medications, past family history, past medical history, past social history, past surgical history and problem list.    Review of Systems   Constitutional: Positive for fatigue. Negative for chills and fever.   HENT: Positive for congestion, ear pain, postnasal drip and sore throat. Negative for sinus pressure.    Respiratory: Positive for cough.    Cardiovascular: Negative.    Gastrointestinal: Negative.    Skin: Negative.    Neurological: Negative for headache.   Hematological: Negative.    Psychiatric/Behavioral: Negative.  Positive for depressed mood (improved with levothyroxine).     /62   Pulse 71   Temp 99 °F (37.2 °C) (Oral)   Ht 161.9 cm (63.75\")   Wt 86.6 kg (191 lb)   SpO2 97%   BMI 33.04 kg/m²     Objective   Physical Exam   Constitutional: She appears well-developed and well-nourished.   HENT:   Right Ear: A middle ear effusion is present.   Left Ear: A middle ear effusion is present.   Nose: Mucosal edema present. Right sinus exhibits no maxillary sinus tenderness and no frontal sinus tenderness. Left sinus exhibits no maxillary sinus tenderness and no frontal sinus tenderness.   Mouth/Throat: Uvula is midline and mucous membranes are normal. Posterior oropharyngeal erythema present.   Neck: Normal range of motion. Neck supple. No tracheal deviation present. No thyromegaly present.   Cardiovascular: Normal rate, regular rhythm and normal heart sounds.   Pulmonary/Chest: Effort normal and breath sounds normal.   Lymphadenopathy:     " She has no cervical adenopathy.   Skin: Skin is warm and dry. Capillary refill takes less than 2 seconds.   Psychiatric: She has a normal mood and affect. Her behavior is normal. Judgment and thought content normal.   Nursing note and vitals reviewed.    Assessment/Plan   Problems Addressed this Visit        Endocrine    Hashimoto's thyroiditis - Primary    Relevant Orders    CBC & Differential    Comprehensive Metabolic Panel    Thyroid Antibodies    TSH      Other Visit Diagnoses     Acute URI            Thyroiditis--feeling much better on thyroid replacement, insurance wants her to use SNADEC for 90 day supply on this and birth control. Will update labs.   URI--likely viral, continue allegra for symptoms, should settle  Health maintenance--BP stable, recommend baseline cholesterol screening in 30s, declines today.   FU 6 months

## 2019-02-25 LAB
ALBUMIN SERPL-MCNC: 4.1 G/DL (ref 3.5–5.2)
ALBUMIN/GLOB SERPL: 1.2 G/DL
ALP SERPL-CCNC: 62 U/L (ref 39–117)
ALT SERPL-CCNC: 11 U/L (ref 1–33)
AST SERPL-CCNC: 9 U/L (ref 1–32)
BASOPHILS # BLD AUTO: 0.06 10*3/MM3 (ref 0–0.2)
BASOPHILS NFR BLD AUTO: 0.6 % (ref 0–1.5)
BILIRUB SERPL-MCNC: 0.4 MG/DL (ref 0.1–1.2)
BUN SERPL-MCNC: 10 MG/DL (ref 6–20)
BUN/CREAT SERPL: 11.6 (ref 7–25)
CALCIUM SERPL-MCNC: 9.4 MG/DL (ref 8.6–10.5)
CHLORIDE SERPL-SCNC: 100 MMOL/L (ref 98–107)
CO2 SERPL-SCNC: 23.5 MMOL/L (ref 22–29)
CREAT SERPL-MCNC: 0.86 MG/DL (ref 0.57–1)
EOSINOPHIL # BLD AUTO: 0.25 10*3/MM3 (ref 0–0.4)
EOSINOPHIL NFR BLD AUTO: 2.5 % (ref 0.3–6.2)
ERYTHROCYTE [DISTWIDTH] IN BLOOD BY AUTOMATED COUNT: 12.2 % (ref 12.3–15.4)
GLOBULIN SER CALC-MCNC: 3.3 GM/DL
GLUCOSE SERPL-MCNC: 78 MG/DL (ref 65–99)
HCT VFR BLD AUTO: 42.1 % (ref 34–46.6)
HGB BLD-MCNC: 13.4 G/DL (ref 12–15.9)
IMM GRANULOCYTES # BLD AUTO: 0.03 10*3/MM3 (ref 0–0.05)
IMM GRANULOCYTES NFR BLD AUTO: 0.3 % (ref 0–0.5)
LYMPHOCYTES # BLD AUTO: 2.72 10*3/MM3 (ref 0.7–3.1)
LYMPHOCYTES NFR BLD AUTO: 27 % (ref 19.6–45.3)
MCH RBC QN AUTO: 29.8 PG (ref 26.6–33)
MCHC RBC AUTO-ENTMCNC: 31.8 G/DL (ref 31.5–35.7)
MCV RBC AUTO: 93.8 FL (ref 79–97)
MONOCYTES # BLD AUTO: 0.72 10*3/MM3 (ref 0.1–0.9)
MONOCYTES NFR BLD AUTO: 7.1 % (ref 5–12)
NEUTROPHILS # BLD AUTO: 6.29 10*3/MM3 (ref 1.4–7)
NEUTROPHILS NFR BLD AUTO: 62.5 % (ref 42.7–76)
NRBC BLD AUTO-RTO: 0 /100 WBC (ref 0–0)
PLATELET # BLD AUTO: 297 10*3/MM3 (ref 140–450)
POTASSIUM SERPL-SCNC: 4.3 MMOL/L (ref 3.5–5.2)
PROT SERPL-MCNC: 7.4 G/DL (ref 6–8.5)
RBC # BLD AUTO: 4.49 10*6/MM3 (ref 3.77–5.28)
SODIUM SERPL-SCNC: 138 MMOL/L (ref 136–145)
THYROGLOB AB SERPL-ACNC: 168.7 IU/ML (ref 0–0.9)
THYROPEROXIDASE AB SERPL-ACNC: 341 IU/ML (ref 0–34)
TSH SERPL DL<=0.005 MIU/L-ACNC: 3.84 MIU/ML (ref 0.27–4.2)
WBC # BLD AUTO: 10.07 10*3/MM3 (ref 3.4–10.8)

## 2019-04-21 DIAGNOSIS — E06.3 HASHIMOTO'S THYROIDITIS: ICD-10-CM

## 2019-04-22 RX ORDER — LEVOTHYROXINE SODIUM 0.05 MG/1
TABLET ORAL
Qty: 30 TABLET | Refills: 1 | Status: SHIPPED | OUTPATIENT
Start: 2019-04-22 | End: 2020-05-19 | Stop reason: SDUPTHER

## 2019-09-05 RX ORDER — NORGESTIMATE AND ETHINYL ESTRADIOL
KIT
Qty: 28 TABLET | Refills: 4 | Status: SHIPPED | OUTPATIENT
Start: 2019-09-05 | End: 2020-04-27 | Stop reason: SDUPTHER

## 2020-04-27 RX ORDER — NORGESTIMATE AND ETHINYL ESTRADIOL
KIT
Qty: 28 TABLET | Refills: 3 | OUTPATIENT
Start: 2020-04-27

## 2020-04-28 RX ORDER — NORGESTIMATE AND ETHINYL ESTRADIOL 7DAYSX3 LO
1 KIT ORAL DAILY
Qty: 28 TABLET | Refills: 0 | Status: SHIPPED | OUTPATIENT
Start: 2020-04-28 | End: 2020-04-29 | Stop reason: SDUPTHER

## 2020-04-29 ENCOUNTER — TELEMEDICINE (OUTPATIENT)
Dept: FAMILY MEDICINE CLINIC | Facility: CLINIC | Age: 30
End: 2020-04-29

## 2020-04-29 DIAGNOSIS — E06.3 HASHIMOTO'S THYROIDITIS: ICD-10-CM

## 2020-04-29 DIAGNOSIS — Z30.09 BIRTH CONTROL COUNSELING: Primary | ICD-10-CM

## 2020-04-29 PROCEDURE — 99213 OFFICE O/P EST LOW 20 MIN: CPT | Performed by: NURSE PRACTITIONER

## 2020-04-29 RX ORDER — NORGESTIMATE AND ETHINYL ESTRADIOL 7DAYSX3 LO
1 KIT ORAL DAILY
Qty: 28 TABLET | Refills: 11 | Status: SHIPPED | OUTPATIENT
Start: 2020-04-29 | End: 2021-03-02

## 2020-04-29 NOTE — PROGRESS NOTES
Subjective   Crystal Anthony is a 29 y.o. female.     History of Present Illness   On ortho tricyclen low for birth control. Reports last pap in Oct of 2018, was normal. No history of abnormal paps. , no history of STDs, not necessary to screen today for STDs.   No FH breast,cervical or ovarian cancers, no FH colon cancer. Periods normal on birth control.   No FH blood clots. Planning to come off birth control once  has confirmatory sampling post vasectomy. /72 at work. Has been off thyroid medication secondary to lapse in insurance. Has fatigue, no weight gain, but just can't lose at 190  No current breast complaints, does do BSE  The following portions of the patient's history were reviewed and updated as appropriate: allergies, current medications, past family history, past medical history, past social history, past surgical history and problem list.    Review of Systems   Constitutional: Negative for activity change, unexpected weight gain and unexpected weight loss.   Respiratory: Negative.    Cardiovascular: Negative.    Genitourinary: Negative for breast discharge, breast lump, breast pain, genital sores, pelvic pain, vaginal bleeding, vaginal discharge and vaginal pain.   Psychiatric/Behavioral: Negative.      /72     Objective   Physical Exam   Constitutional: She is oriented to person, place, and time. She appears well-developed and well-nourished. No distress.   Pulmonary/Chest: Effort normal.   Neurological: She is alert and oriented to person, place, and time.   Skin: Skin is dry. She is not diaphoretic.   Psychiatric: She has a normal mood and affect. Her behavior is normal. Judgment and thought content normal.   Vitals reviewed.    Assessment/Plan   Problems Addressed this Visit        Endocrine    Hashimoto's thyroiditis      Other Visit Diagnoses     Birth control counseling    -  Primary    Relevant Medications    norgestimate-ethinyl estradiol (Tri-Lo-Rosina) 0.18/0.215/0.25  MG-25 MCG per tablet        Thyroiditis--should update labs, to call once able to do.   Birth control--discussed max weight for added efficacy of OCP and role of watching BP now that she is >30. Reasonable to continue for now,     This is video visit, pt consents to treatment. 15 min EM

## 2020-04-30 VITALS — DIASTOLIC BLOOD PRESSURE: 72 MMHG | SYSTOLIC BLOOD PRESSURE: 120 MMHG

## 2020-05-19 ENCOUNTER — TELEPHONE (OUTPATIENT)
Dept: FAMILY MEDICINE CLINIC | Facility: CLINIC | Age: 30
End: 2020-05-19

## 2020-05-19 DIAGNOSIS — E06.3 HASHIMOTO'S THYROIDITIS: Primary | ICD-10-CM

## 2020-05-19 DIAGNOSIS — Z79.899 DRUG THERAPY: ICD-10-CM

## 2020-05-19 DIAGNOSIS — Z13.220 SCREENING CHOLESTEROL LEVEL: ICD-10-CM

## 2020-05-19 DIAGNOSIS — E06.3 HASHIMOTO'S THYROIDITIS: ICD-10-CM

## 2020-05-19 RX ORDER — LEVOTHYROXINE SODIUM 0.05 MG/1
50 TABLET ORAL DAILY
Qty: 30 TABLET | Refills: 0 | Status: SHIPPED | OUTPATIENT
Start: 2020-05-19 | End: 2020-06-05

## 2020-05-19 NOTE — TELEPHONE ENCOUNTER
PATIENT CALLED AND STATED THAT SHE BELIEVES SHE NEEDS LABS DONE. SHE WOULD LIKE TO KNOW IF SHE HAS TO BE SEEN IN OFFICE OR THROUGH A MYCHART VIDEO VISIT BEFORE SHE CAN HAVE LABS DONE. PLEASE ADVISE.     PATIENT CALL BACK [phone]

## 2020-05-23 LAB
ALBUMIN SERPL-MCNC: 4.5 G/DL (ref 3.5–5.2)
ALBUMIN/GLOB SERPL: 1.7 G/DL
ALP SERPL-CCNC: 52 U/L (ref 39–117)
ALT SERPL-CCNC: 23 U/L (ref 1–33)
AST SERPL-CCNC: 18 U/L (ref 1–32)
BILIRUB SERPL-MCNC: 0.3 MG/DL (ref 0.2–1.2)
BUN SERPL-MCNC: 11 MG/DL (ref 6–20)
BUN/CREAT SERPL: 12.9 (ref 7–25)
CALCIUM SERPL-MCNC: 9.5 MG/DL (ref 8.6–10.5)
CHLORIDE SERPL-SCNC: 101 MMOL/L (ref 98–107)
CHOLEST SERPL-MCNC: 174 MG/DL (ref 0–200)
CO2 SERPL-SCNC: 25.6 MMOL/L (ref 22–29)
CREAT SERPL-MCNC: 0.85 MG/DL (ref 0.57–1)
GLOBULIN SER CALC-MCNC: 2.7 GM/DL
GLUCOSE SERPL-MCNC: 94 MG/DL (ref 65–99)
HDLC SERPL-MCNC: 60 MG/DL (ref 40–60)
LDLC SERPL CALC-MCNC: 96 MG/DL (ref 0–100)
POTASSIUM SERPL-SCNC: 4.4 MMOL/L (ref 3.5–5.2)
PROT SERPL-MCNC: 7.2 G/DL (ref 6–8.5)
SODIUM SERPL-SCNC: 136 MMOL/L (ref 136–145)
T3FREE SERPL-MCNC: 3.1 PG/ML (ref 2–4.4)
T4 FREE SERPL-MCNC: 1.17 NG/DL (ref 0.93–1.7)
TRIGL SERPL-MCNC: 92 MG/DL (ref 0–150)
TSH SERPL DL<=0.005 MIU/L-ACNC: 3.91 UIU/ML (ref 0.27–4.2)
VLDLC SERPL CALC-MCNC: 18.4 MG/DL (ref 5–40)

## 2020-05-31 NOTE — PROGRESS NOTES
Call and mail copy of results to patient.  Thyroid appropriate range  Cholesterol excellent range  Kidney and liver function normal

## 2020-06-05 ENCOUNTER — TELEMEDICINE (OUTPATIENT)
Dept: FAMILY MEDICINE CLINIC | Facility: CLINIC | Age: 30
End: 2020-06-05

## 2020-06-05 DIAGNOSIS — E06.3 HYPOTHYROIDISM DUE TO HASHIMOTO'S THYROIDITIS: Primary | ICD-10-CM

## 2020-06-05 DIAGNOSIS — E03.8 HYPOTHYROIDISM DUE TO HASHIMOTO'S THYROIDITIS: Primary | ICD-10-CM

## 2020-06-05 PROCEDURE — 99213 OFFICE O/P EST LOW 20 MIN: CPT | Performed by: FAMILY MEDICINE

## 2020-06-05 RX ORDER — LEVOTHYROXINE SODIUM 0.07 MG/1
75 TABLET ORAL DAILY
Qty: 30 TABLET | Refills: 5 | Status: SHIPPED | OUTPATIENT
Start: 2020-06-05 | End: 2021-03-02 | Stop reason: SDUPTHER

## 2020-06-05 NOTE — PROGRESS NOTES
Subjective   Crystal Anthony is a 29 y.o. female. Presents today for   Chief Complaint   Patient presents with   • Hypothyroidism      Patient VIDEO VISIT, she gave consent to treat.  Audio only worked    Hypothyroidism   This is a chronic problem. The current episode started more than 1 year ago. The problem occurs constantly. The problem has been unchanged. Associated symptoms include fatigue. Pertinent negatives include no chest pain. Treatments tried: on levothyroxine. Improvement on treatment: feels like thyroid underactive, but labs normal.       Review of Systems   Constitutional: Positive for fatigue.   Cardiovascular: Negative for chest pain.       Patient Active Problem List   Diagnosis   • Hashimoto's thyroiditis       Social History     Socioeconomic History   • Marital status:      Spouse name: Not on file   • Number of children: Not on file   • Years of education: Not on file   • Highest education level: Not on file   Tobacco Use   • Smoking status: Never Smoker   • Smokeless tobacco: Never Used   Substance and Sexual Activity   • Alcohol use: Yes     Alcohol/week: 1.0 standard drinks     Types: 1 Glasses of wine per week     Comment: weekends   • Drug use: Never   • Sexual activity: Yes     Partners: Male     Birth control/protection: OCP       No Known Allergies    Current Outpatient Medications on File Prior to Visit   Medication Sig Dispense Refill   • fexofenadine (ALLEGRA) 180 MG tablet Take 180 mg by mouth Daily.     • norgestimate-ethinyl estradiol (Tri-Lo-Rosina) 0.18/0.215/0.25 MG-25 MCG per tablet Take 1 tablet by mouth Daily. 28 tablet 11   • [DISCONTINUED] levothyroxine (SYNTHROID) 50 MCG tablet Take 1 tablet by mouth Daily. 30 tablet 1   • [DISCONTINUED] levothyroxine (SYNTHROID, LEVOTHROID) 50 MCG tablet Take 1 tablet by mouth Daily. PLEASE CALL THE OFFICE FOR AN APPT 30 tablet 0     No current facility-administered medications on file prior to visit.        Objective   There were  no vitals filed for this visit.  There is no height or weight on file to calculate BMI.    Physical Exam   Psychiatric: She has a normal mood and affect. Her behavior is normal. Judgment and thought content normal.     Component      Latest Ref Rng & Units 5/22/2020   Glucose      65 - 99 mg/dL 94   BUN      6 - 20 mg/dL 11   Creatinine      0.57 - 1.00 mg/dL 0.85   eGFR Non African Am      >60 mL/min/1.73 78   eGFR African Am      >60 mL/min/1.73 95   BUN/Creatinine Ratio      7.0 - 25.0 12.9   Sodium      136 - 145 mmol/L 136   Potassium      3.5 - 5.2 mmol/L 4.4   Chloride      98 - 107 mmol/L 101   CO2      22.0 - 29.0 mmol/L 25.6   Calcium      8.6 - 10.5 mg/dL 9.5   Total Protein      6.0 - 8.5 g/dL 7.2   Albumin      3.50 - 5.20 g/dL 4.50   Globulin      gm/dL 2.7   A/G Ratio      g/dL 1.7   Total Bilirubin      0.2 - 1.2 mg/dL 0.3   Alkaline Phosphatase      39 - 117 U/L 52   AST (SGOT)      1 - 32 U/L 18   ALT (SGPT)      1 - 33 U/L 23   Total Cholesterol      0 - 200 mg/dL 174   Triglycerides      0 - 150 mg/dL 92   HDL Cholesterol      40 - 60 mg/dL 60   VLDL Cholesterol      5 - 40 mg/dL 18.4   LDL Cholesterol       0 - 100 mg/dL 96   TSH Baseline      0.270 - 4.200 uIU/mL 3.910   T3, Free      2.0 - 4.4 pg/mL 3.1   Free T4      0.93 - 1.70 ng/dL 1.17     Assessment/Plan   Crystal was seen today for hypothyroidism.    Diagnoses and all orders for this visit:    Hypothyroidism due to Hashimoto's thyroiditis  -     levothyroxine (SYNTHROID, LEVOTHROID) 75 MCG tablet; Take 1 tablet by mouth Daily.  -     T3, Free  -     T4, Free  -     TSH  -     Thyroglobulin      Discussed with thyroid labs and can try adjusting levothyroxine to help feel better to bring TSH to <3.  She does drink coffee all day and up until 5pm, encouraged to stop at 12pm as can impact restful sleep.  Walkin to lab in 6 weeks to recheck thyroid labs, call if signs/symptoms over active.       -Follow up: 3 months and prn

## 2021-03-02 ENCOUNTER — OFFICE VISIT (OUTPATIENT)
Dept: FAMILY MEDICINE CLINIC | Facility: CLINIC | Age: 31
End: 2021-03-02

## 2021-03-02 VITALS
HEIGHT: 66 IN | BODY MASS INDEX: 32.14 KG/M2 | WEIGHT: 200 LBS | HEART RATE: 68 BPM | SYSTOLIC BLOOD PRESSURE: 116 MMHG | DIASTOLIC BLOOD PRESSURE: 74 MMHG | OXYGEN SATURATION: 99 % | TEMPERATURE: 97.5 F

## 2021-03-02 DIAGNOSIS — R41.840 LACK OF CONCENTRATION: ICD-10-CM

## 2021-03-02 DIAGNOSIS — M79.672 INTRACTABLE LEFT HEEL PAIN: Primary | ICD-10-CM

## 2021-03-02 DIAGNOSIS — E06.3 HYPOTHYROIDISM DUE TO HASHIMOTO'S THYROIDITIS: ICD-10-CM

## 2021-03-02 DIAGNOSIS — E03.8 HYPOTHYROIDISM DUE TO HASHIMOTO'S THYROIDITIS: ICD-10-CM

## 2021-03-02 PROCEDURE — 99214 OFFICE O/P EST MOD 30 MIN: CPT | Performed by: NURSE PRACTITIONER

## 2021-03-02 RX ORDER — LEVOTHYROXINE SODIUM 0.07 MG/1
75 TABLET ORAL DAILY
Qty: 90 TABLET | Refills: 1 | Status: SHIPPED | OUTPATIENT
Start: 2021-03-02 | End: 2021-08-25 | Stop reason: SDUPTHER

## 2021-03-02 RX ORDER — FEXOFENADINE HCL AND PSEUDOEPHEDRINE HCI 180; 240 MG/1; MG/1
1 TABLET, EXTENDED RELEASE ORAL DAILY
Qty: 90 TABLET | Refills: 1 | Status: SHIPPED | OUTPATIENT
Start: 2021-03-02

## 2021-03-02 NOTE — PROGRESS NOTES
Subjective   Crystal Anthony is a 30 y.o. female who presents c/o knot on left foot causing pain, spot on left breast that is concerning, trouble with concentration, needs TSH follow up.     History of Present Illness   Knot on L foot--feels better on or near the weekends. Painful in work boots. Has tried inserts. Wearing cowboy boots today. Tennis shoes, tight are more comfortable.   Spot on L breast--spot since child born--thinks clogged milk duct. Will have discharge at times. No early FH breast cancer.  Trouble concentrating--started school and job. Having trouble staying on task. Was on wellbutrin in the past, but made crazy according to spouse. Getting 10 hours sleep/day. Feels very overwhelmed in pandemic lifestyle  TSH--having trouble remembering to take levothyroxine. 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 1 biological son  Mom took zoloft and this left a bed taste in her mouth. Bad post partum with her little brother.   The following portions of the patient's history were reviewed and updated as appropriate: allergies, current medications, past family history, past medical history, past social history, past surgical history and problem list.    Review of Systems   Constitutional: Positive for fatigue. Negative for activity change, chills, fever and unexpected weight change.   HENT: Negative.    Eyes: Negative.  Negative for visual disturbance.   Respiratory: Negative.    Cardiovascular: Negative.  Negative for chest pain.   Gastrointestinal: Negative.  Negative for constipation and diarrhea.   Endocrine: Negative.    Genitourinary: Negative for difficulty urinating and frequency.   Musculoskeletal: Positive for arthralgias.   Neurological: Negative for weakness and headaches.   Hematological: Negative.    Psychiatric/Behavioral: Positive for behavioral problems, decreased concentration and dysphoric mood. The patient is nervous/anxious.      /74   " Pulse 68   Temp 97.5 °F (36.4 °C) (Infrared)   Ht 167.6 cm (66\")   Wt 90.7 kg (200 lb)   LMP 02/25/2021 (Approximate)   SpO2 99%   BMI 32.28 kg/m²     Objective   Physical Exam  Vitals signs and nursing note reviewed. Exam conducted with a chaperone present.   Constitutional:       Appearance: She is well-developed. She is not diaphoretic.   HENT:      Head: Normocephalic and atraumatic.   Neck:      Musculoskeletal: Normal range of motion and neck supple.      Thyroid: No thyromegaly.   Cardiovascular:      Rate and Rhythm: Normal rate and regular rhythm.      Pulses:           Carotid pulses are 2+ on the right side and 2+ on the left side.     Heart sounds: Normal heart sounds.   Pulmonary:      Effort: Pulmonary effort is normal.      Breath sounds: Normal breath sounds.   Chest:      Chest wall: No mass.      Breasts:         Left: No swelling, bleeding, inverted nipple, mass, nipple discharge, skin change or tenderness.   Musculoskeletal:      Left foot: Normal range of motion. Bony tenderness and deformity present. No swelling.        Feet:    Lymphadenopathy:      Cervical: No cervical adenopathy.      Upper Body:      Left upper body: No supraclavicular, axillary or pectoral adenopathy.   Psychiatric:         Attention and Perception: Attention normal.         Mood and Affect: Mood is anxious and depressed. Affect is tearful.         Speech: Speech normal.         Behavior: Behavior normal.         Thought Content: Thought content normal.         Judgment: Judgment normal.       Assessment/Plan   Problems Addressed this Visit     None      Visit Diagnoses     Intractable left heel pain    -  Primary    Relevant Orders    XR Calcaneus 2+ View Left (In Office)    Ambulatory Referral to Physical Therapy Evaluate and treat (consider iontophoresis), Bioimpedance    Hypothyroidism due to Hashimoto's thyroiditis        Relevant Medications    levothyroxine (SYNTHROID, LEVOTHROID) 75 MCG tablet    Lack of " concentration          Diagnoses       Codes Comments    Intractable left heel pain    -  Primary ICD-10-CM: M79.672  ICD-9-CM: 729.5     Hypothyroidism due to Hashimoto's thyroiditis     ICD-10-CM: E03.8, E06.3  ICD-9-CM: 244.8, 245.2     Lack of concentration     ICD-10-CM: R41.840  ICD-9-CM: 799.51         Heel pain--xray with achilles spur, no comparison--will send to radiology for additional interpretation. Failed lifts and NSAIDs--recommend PT to address  Hypothyroid--thyroid enlarged--exam consistent with previous US, no suspicious lesions. Restart medication and check labs in 6-8 weeks  Lack of concentration--declines medication. Recommend counseling. Consider citalopram if decides to try. Would prefer low dose.

## 2021-03-18 ENCOUNTER — APPOINTMENT (OUTPATIENT)
Dept: PHYSICAL THERAPY | Facility: HOSPITAL | Age: 31
End: 2021-03-18

## 2021-04-01 ENCOUNTER — HOSPITAL ENCOUNTER (OUTPATIENT)
Dept: PHYSICAL THERAPY | Facility: HOSPITAL | Age: 31
Setting detail: THERAPIES SERIES
Discharge: HOME | End: 2021-04-01

## 2021-04-01 DIAGNOSIS — R26.2 DIFFICULTY WALKING: ICD-10-CM

## 2021-04-01 DIAGNOSIS — M79.672 CHRONIC HEEL PAIN, LEFT: ICD-10-CM

## 2021-04-01 DIAGNOSIS — M77.32 HEEL SPUR, LEFT: Primary | ICD-10-CM

## 2021-04-01 DIAGNOSIS — G89.29 CHRONIC HEEL PAIN, LEFT: ICD-10-CM

## 2021-04-01 PROCEDURE — 97162 PT EVAL MOD COMPLEX 30 MIN: CPT

## 2021-04-01 PROCEDURE — 97110 THERAPEUTIC EXERCISES: CPT

## 2021-04-01 NOTE — THERAPY DISCHARGE NOTE
Outpatient Physical Therapy Ortho Initial Evaluation/Discharge  [facility]     Patient Name: Crystal Anthony  : 1990  MRN: [id number]  Today's Date: 2021      Visit Date: 2021    Patient Active Problem List   Diagnosis   • Hashimoto's thyroiditis        Past Medical History:   Diagnosis Date   • Allergic     seasonal   • Hypothyroidism 2017    hashimoto        Past Surgical History:   Procedure Laterality Date   • DILATATION AND CURETTAGE           Visit Dx:     ICD-10-CM ICD-9-CM   1. Heel spur, left  M77.32 726.73   2. Chronic heel pain, left  M79.672 729.5    G89.29 338.29   3. Difficulty walking  R26.2 719.7       Patient History     Row Name 21 1600             History    Chief Complaint  Pain  -CA      Type of Pain  Foot pain heel pain  -CA      Brief Description of Current Complaint  Crystal Anthony is a 30 y.o. female who presents today with L heel pain. Pt saw her PCP, XR remarkable for bone spur in L heel. She reports pain has been ongoing for about a year, insidious onset. 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 Patient has tried compression socks, inserts, heel lifts, gel cushion, heel insert with gel pad. She is required in wear boots at work, which increases her pain. Crystal Anthony reports difficulty/increased pain with standing, walking, running (unable to run), stairs, any weight bearing position. Pain relieving factors include sitting, getting off feet for a few hours, soaking in epsom salt bath. PMH unremarkable.   -CA         Pain     Pain Location  Foot left heel  -CA      Pain at Present  3 sitting  -CA      Pain at Best  3  -CA      Pain at Worst  7  -CA      What Performance Factors Make the Current Problem(s) WORSE?  anything weight bearing  -CA      What Performance Factors Make the Current Problem(s)  BETTER?  sitting down/lying down  -CA         Daily Activities    Primary Language  English  -CA      Pt Participated in POC and Goals  Yes  -CA         Safety    Are you being hurt, hit, or frightened by anyone at home or in your life?  No  -CA      Are you being neglected by a caregiver  No  -CA      Have you had any of the following issues with  N/A  -CA        User Key  (r) = Recorded By, (t) = Taken By, (c) = Cosigned By    Initials Name Provider Type    Tiffanie Shultz PT Physical Therapist          PT Ortho     Row Name 04/01/21 1600       General ROM    RT Lower Ext  Rt Ankle Dorsiflexion;Rt Ankle Plantarflexion  -CA    LT Lower Ext  Lt Ankle Dorsiflexion;Lt Ankle Plantarflexion  -CA       Right Lower Ext    Rt Ankle Dorsiflexion AROM  15  -CA    Rt Ankle Plantarflexion AROM  55  -CA       Left Lower Ext    Lt Ankle Dorsiflexion AROM  15  -CA    Lt Ankle Plantarflexion AROM  55  -CA       MMT (Manual Muscle Testing)    Rt Lower Ext  Rt Hip Flexion;Rt Hip ABduction;Rt Knee Extension;Rt Knee Flexion;Rt Ankle Dorsiflexion;Rt Ankle Plantarflexion;Rt Ankle Subtalar Inversion;Rt Ankle Subtalar Eversion  -CA    Lt Lower Ext  Lt Hip Flexion;Lt Hip ABduction;Lt Knee Extension;Lt Knee Flexion;Lt Ankle Plantarflexion;Lt Ankle Dorsiflexion;Lt Ankle Subtalar Inversion;Lt Ankle Subtalar Eversion  -CA       MMT Right Lower Ext    Rt Hip Flexion MMT, Gross Movement  (5/5) normal  -CA    Rt Hip ABduction MMT, Gross Movement  (3+/5) fair plus  -CA    Rt Knee Extension MMT, Gross Movement  (5/5) normal  -CA    Rt Knee Flexion MMT, Gross Movement  (5/5) normal  -CA    Rt Ankle Plantarflexion MMT, Gross Movement  (5/5) normal  -CA    Rt Ankle Dorsiflexion MMT, Gross Movement  (5/5) normal  -CA    Rt Ankle Subtalar Inversion MT, Gross Movement  (4+/5) good plus  -CA    Rt Ankle Subtalar Eversion MMT, Gross Movement  (5/5) normal  -CA       MMT Left Lower Ext    Lt Hip Flexion MMT, Gross Movement  (5/5) normal  -CA    Lt Hip  ABduction MMT, Gross Movement  (3+/5) fair plus  -CA    Lt Knee Extension MMT, Gross Movement  (5/5) normal  -CA    Lt Knee Flexion MMT, Gross Movement  (5/5) normal  -CA    Lt Ankle Plantarflexion MMT, Gross Movement  (5/5) normal  -CA    Lt Ankle Dorsiflexion MMT, Gross Movement  (5/5) normal  -CA    Lt Ankle Subtalar Inversion MMT, Gross Movement  (4+/5) good plus  -CA    Lt Ankle Subtalar Eversion MMT, Gross Movement  (5/5) normal  -CA       Lower Extremity Flexibility    Gastrocnemius  -- unable to test d/t pain  -CA    Soleus  -- unable to test d/t pain  -CA       Balance Skills Training    SLS  unable to test d/t pain  -CA       Gait/Stairs (Locomotion)    Comment (Gait/Stairs)  antalgic  -CA      User Key  (r) = Recorded By, (t) = Taken By, (c) = Cosigned By    Initials Name Provider Type    Tiffanie Shultz, PT Physical Therapist                       Therapy Education  Education Details: Access code:[email or web address]; eval findings, recommendation for referral to ortho MD, possible f/u in future pending results of MD visit  Given: HEP  Program: New  How Provided: Verbal, Demonstration, Written  Provided to: Patient  Level of Understanding: Teach back education performed, Verbalized, Demonstrated        PT Assessment/Plan     Row Name 04/01/21 1600          PT Assessment    Functional Limitations  Impaired gait;Impaired locomotion;Limitations in community activities;Limitations in functional capacity and performance;Performance in leisure activities;Performance in self-care ADL;Performance in sport activities;Performance in work activities  -CA     Impairments  Pain;Gait;Muscle strength  -CA     Assessment Comments  Crystal Anthony is a 30 y.o. female referred to physical therapy for L heel spur. She presents with an evolving clinical presentation, along with no remarkable comorbidities and personal factors of failed treatment with various footware/inserts/heel gel pads/stretches, and job that requires her to be  on her feet throughout the whole day. that may impact her progress in the plan of care. Pt presents today with L heel pain (worse in weight bearing positions), and decreased B hip strength . Her signs and symptoms are consistent with referring diagnosis. The previous impairments limit her ability to stand, walk, and perform job duties. Given patient has already trialed appropriate conservative measures on her own, she is not a good candidate for PT at this time and would benefit from referral to orthopedic MD.   -CA     Please refer to paper survey for additional self-reported information  Yes  -CA     Rehab Potential  Other (comment) recommend referral to Ortho MD  -CA     Patient/caregiver participated in establishment of treatment plan and goals  No  -CA     Patient would benefit from skilled therapy intervention  No not at this time - recommend referral to ortho MD  -CA        PT Plan    PT Frequency  One time visit referral to MD, may return in future pending course of care  -CA     Predicted Duration of Therapy Intervention (PT)  see above  -CA     Planned CPT's?  PT EVAL LOW COMPLEXITY: 64778  -CA     PT Plan Comments  referral to ortho MD. May return in future pending course of care  -CA       User Key  (r) = Recorded By, (t) = Taken By, (c) = Cosigned By    Initials Name Provider Type    Tiffanie Shultz, PT Physical Therapist          OP Exercises     Row Name 04/01/21 1600             Total Minutes    70567 - PT Therapeutic Exercise Minutes  20 including education  -CA         Exercise 1    Exercise Name 1  b s/l clam  -CA      Cueing 1  Verbal  -CA      Sets 1  2  -CA      Reps 1  10  -CA      Time 1  rtb  -CA         Exercise 2    Exercise Name 2  ankle PF, evr, inv  -CA      Cueing 2  Verbal  -CA      Reps 2  10 ea  -CA      Time 2  rtb  -CA         Exercise 3    Exercise Name 3  attempted gastroc and soleus str but held d/t pain  -CA        User Key  (r) = Recorded By, (t) = Taken By, (c) =  Cosigned By    Initials Name Provider Type    CA Tiffanie Smith, PT Physical Therapist                       Outcome Measure Options: FAAM  FAAM  FAAM: 39/84      Time Calculation:   Start Time: 1600  Stop Time: 1632  Time Calculation (min): 32 min  Total Timed Code Minutes- PT: 20 minute(s)  Therapy Charges for Today     Code Description Service Date Service Provider Modifiers Qty    33730475758 HC PT THER PROC EA 15 MIN 4/1/2021 Tiffanie Smith, PT GP 1    95497037803 HC PT EVAL MOD COMPLEXITY 1 4/1/2021 Tiffanie Smith, PT GP 1          PT G-Codes  Outcome Measure Options: MERLIN Smith, PT  4/1/2021

## 2021-04-02 DIAGNOSIS — M79.672 INTRACTABLE LEFT HEEL PAIN: Primary | ICD-10-CM

## 2021-04-16 ENCOUNTER — BULK ORDERING (OUTPATIENT)
Dept: CASE MANAGEMENT | Facility: OTHER | Age: 31
End: 2021-04-16

## 2021-04-16 DIAGNOSIS — Z23 IMMUNIZATION DUE: ICD-10-CM

## 2021-04-26 ENCOUNTER — OFFICE VISIT (OUTPATIENT)
Dept: ORTHOPEDIC SURGERY | Facility: CLINIC | Age: 31
End: 2021-04-26

## 2021-04-26 VITALS — WEIGHT: 200 LBS | TEMPERATURE: 96.2 F | HEIGHT: 66 IN | BODY MASS INDEX: 32.14 KG/M2

## 2021-04-26 DIAGNOSIS — M65.28 CALCIFIC ACHILLES TENDONITIS: Primary | ICD-10-CM

## 2021-04-26 DIAGNOSIS — M92.60 HAGLUND'S DEFORMITY: ICD-10-CM

## 2021-04-26 DIAGNOSIS — R52 PAIN: ICD-10-CM

## 2021-04-26 PROCEDURE — 99204 OFFICE O/P NEW MOD 45 MIN: CPT | Performed by: ORTHOPAEDIC SURGERY

## 2021-04-26 PROCEDURE — 73650 X-RAY EXAM OF HEEL: CPT | Performed by: ORTHOPAEDIC SURGERY

## 2021-04-26 NOTE — PROGRESS NOTES
"   New Patient Complaint      Patient: Crystal Anthony  YOB: 1990 30 y.o. female  Medical Record Number: [id number]    Chief Complaints: My heel hurts    History of Present Illness: Patient reports moderate aching burning pain mainly in the posterior aspect of the left hindfoot that began in early 2020 without specific injury.  She has redness and swelling mainly isolates to the posterior aspect of the left hindfoot at the Achilles insertion.  Symptoms are worse with standing walking running and climbing stairs.    She does have some start up pain but symptoms do not completely resolve with ambulation.  She has done some physical therapy in March without improvement and also has been using some heel lifts which makes it more comfortable but is unhappy that she cannot \"run with her kids\".  She currently works in commercial electric.      She is seen at the request of CARLOS Sarah who has requested my opinion regarding etiology and treatment of this condition    HPI    Allergies: No Known Allergies    Medications:   Current Outpatient Medications on File Prior to Visit   Medication Sig   • fexofenadine-pseudoephedrine ([facility]legra-D Allergy & Congestion) 180-240 MG per 24 hr tablet Take 1 tablet by mouth Daily.   • levothyroxine (SYNTHROID, LEVOTHROID) 75 MCG tablet Take 1 tablet by mouth Daily.     No current facility-administered medications on file prior to visit.       Past Medical History:   Diagnosis Date   • Allergic 2017    seasonal   • Hypothyroidism 2017    hashimoto     Past Surgical History:   Procedure Laterality Date   • DILATATION AND CURETTAGE       Social History     Occupational History   • Not on file   Tobacco Use   • Smoking status: Never Smoker   • Smokeless tobacco: Never Used   Substance and Sexual Activity   • Alcohol use: Yes     Alcohol/week: 1.0 standard drinks     Types: 1 Glasses of wine per week     Comment: weekends   • Drug use: Never   • Sexual activity: Yes     " "Partners: Male     Birth control/protection: OCP      Social History     Social History Narrative   • Not on file     Family History   Problem Relation Age of Onset   • Colon cancer Maternal Grandfather    • Diabetes Maternal Grandfather    • Diabetes type II Paternal Grandfather    • Diabetes Paternal Grandfather    • Diabetes type II Other         Great grandma maternal   • Drug abuse Father    • Miscarriages / Stillbirths Mother    • Miscarriages / Stillbirths Maternal Grandmother    • Thyroid disease Paternal Aunt        Review of Systems: 14 point review of systems performed, positive pertinent findings identified in HPI. All remaining systems negative     Review of Systems      Physical Exam:   Vitals:    04/26/21 1419   Temp: 96.2 °F (35.7 °C)   Weight: 90.7 kg (200 lb)   Height: 167.6 cm (66\")   PainSc:   5     Physical Exam   Constitutional: pleasant, well developed   Eyes: sclera non icteric  Hearing : adequate for exam  Cardiovascular: palpable pulses in left foot, left calf/ thigh NT without sign of DVT  Respiratoy: breathing unlabored   Neurological: grossly sensate to LT throughout left LE  Psychiatric: oriented with normal mood and affect.   Lymphatic: No palpable popliteal lymphadenopathy left LE  Skin: intact throughout left leg/foot  Musculoskeletal: Left hindfoot shows moderate bony prominence at the insertion of the Achilles with tenderness to palpation no palpable defects or bursal formation.  She had about 5 degrees of dorsiflexion of the heel cord and some discomfort with plantarflexion.  No pain with medial lateral calcaneal compression and no palpable defects of the Achilles.  Physical Exam  Ortho Exam    Radiology: 2 views of the left heel ordered evaluate pain reviewed and no other films available for comparison but did review report from 3-2-21 which indicated plantar spurring as well as spurring at the insertion of the Achilles.    Today's x-rays show some mild plantar calcaneal spurring " is also spurring at the insertion of the Achilles distally as well as prominent superior calcaneal tuberosity with 1 small chronic appearing ossification just posterior to the superior portion of the calcaneal tuberosity    Assessment/Plan: 1.  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    We discussed operative and nonoperative treatment options and we need to go ahead and get an MRI to evaluate the extent of involvement of her Achilles as this is likely going to require surgical treatment.    We will have her start with some conservative measures in the interim and she was fitted with a night splint discussed etiology with heel use and provided a leather heel lift.    Also demonstrated gentle heel cord stretching exercises to do at least 4 times a day.  Instruction sheet was provided and demonstrated for her.    Also have her apply Pennsaid to the area twice daily.    We will check her back in 4 weeks to assess progress and review MRI and determine treatment course going forward.

## 2021-04-27 RX ORDER — DICLOFENAC SODIUM 20 MG/G
1 SOLUTION TOPICAL 2 TIMES DAILY
Qty: 112 G | Refills: 12 | Status: SHIPPED | OUTPATIENT
Start: 2021-04-27 | End: 2021-08-23

## 2021-05-25 ENCOUNTER — HOSPITAL ENCOUNTER (OUTPATIENT)
Dept: MRI IMAGING | Facility: HOSPITAL | Age: 31
Discharge: HOME | End: 2021-05-25
Admitting: ORTHOPAEDIC SURGERY

## 2021-05-25 DIAGNOSIS — M65.28 CALCIFIC ACHILLES TENDONITIS: ICD-10-CM

## 2021-05-25 DIAGNOSIS — M92.60 HAGLUND'S DEFORMITY: ICD-10-CM

## 2021-05-25 PROCEDURE — 73721 MRI JNT OF LWR EXTRE W/O DYE: CPT

## 2021-06-08 NOTE — PROGRESS NOTES
"Ankle Follow Up      Patient: Crystal Anthony    YOB: 1990 30 y.o. female    Chief Complaints: Heel still hurts    History of Present Illness: Patient was seen initially on 4/26/2021 with history of moderate aching burning pain mainly in the posterior aspect the left hindfoot that began in early 2020 without specific injury and mainly isolated to the posterior aspect of the left hindfoot at the Achilles insertion.  She did report some start up pain but symptoms did not completely resolve with ambulation and had done some physical therapy without improvement.  She had been using some heel lifts but was unhappy that she could not \"run with her kids\".  She works in commercial electric.    She was fitted with a night splint and instructed on heel cord stretching exercises as well as leather heel lifts and instructed on use of Pennsaid and sent for MRI for further evaluation.    She has been unable to tolerate the night splint as it made her symptoms worse but does wear it occasionally during the day.  She is doing heel cord stretching about 3 times a day and said that the Pennsaid burns.  She is not really sure if the heel cord stretching is helped but the newer heel lift has seemed to have been beneficial to her.  However she still has moderate complaints of stabbing aching pain in the posterior aspect of the left hindfoot.    HPI    ROS: ankle pain  Past Medical History:   Diagnosis Date   • Allergic 2017    seasonal   • Hypothyroidism 2017    hashimoto       Physical Exam:   Vitals:    06/09/21 1021   Temp: 96.9 °F (36.1 °C)   Weight: 90.7 kg (200 lb)   Height: 167.6 cm (66\")   PainSc:   3     Well developed with normal mood.  On exam she has moderate discomfort at the insertion of the left Achilles.  She was able actually to dorsiflex to about 15 degrees beyond neutral with the knee extended and had no change with flexion of the knee indicating an essentially negative Silfverskiold test.  She had 5 out " of 5 plantar flexion strength.  She had minimal discomfort at the insertion of the plantar fascia.      Radiology: MRI films and report of the left hindfoot dated 5/25/2021 reviewed which show enlargement of the distal Achilles greatest at its insertion with intermediate T2 signal but no intrinsic fluid signal tear or retraction.  There was fluid distention of the retrocalcaneal bursa and reactive calcaneal marrow edema extending deep to the Achilles insertion without fracture.    There was degenerative plantar calcaneal spur with mild signal within the plantar aponeurosis consistent with chronic fasciitis.    No peroneal tendon tear or osteochondral lesion of the talus and medial and lateral ankle ligamentous structures were intact    Assessment/Plan: 1.  Left calcific insertional Achilles tendinopathy with reactive calcaneal marrow edema   2.  Left mild chronic plantar fasciitis with degenerative plantar calcaneal spur    We long discussion regarding treatment options and she has had quite a bit of exhausted conservative measures with continued complaints of pain and would like to proceed with operative treatment but not until later in the year.  I reviewed the operative procedure in detail with her and she wants to save up some time and money so that she can be off work during this.    Reviewed with her that we would need to do an excisional debridement of her Achilles and secondary repair would unlikely would need to do a gastroc recession or tendon transfer.    She does not want to do anything till November or December so we will see her back in about 4 months with x-rays of her left heel and determine treatment course going forward.  She was encouraged to call if she has any questions prior to surgery and in the interim we will continue with heel cord stretching exercises and use of a heel lift and night splint if she can tolerate it but avoid using the Pennsaid if it is causing any skin irritation.

## 2021-06-09 ENCOUNTER — OFFICE VISIT (OUTPATIENT)
Dept: ORTHOPEDIC SURGERY | Facility: CLINIC | Age: 31
End: 2021-06-09

## 2021-06-09 VITALS — TEMPERATURE: 96.9 F | BODY MASS INDEX: 32.14 KG/M2 | WEIGHT: 200 LBS | HEIGHT: 66 IN

## 2021-06-09 DIAGNOSIS — M65.28 CALCIFIC ACHILLES TENDONITIS: ICD-10-CM

## 2021-06-09 DIAGNOSIS — M92.60 HAGLUND'S DEFORMITY: Primary | ICD-10-CM

## 2021-06-09 DIAGNOSIS — M72.2 PLANTAR FASCIITIS: ICD-10-CM

## 2021-06-09 PROCEDURE — 99213 OFFICE O/P EST LOW 20 MIN: CPT | Performed by: ORTHOPAEDIC SURGERY

## 2021-06-21 RX ORDER — CITALOPRAM 10 MG/1
TABLET ORAL
Qty: 30 TABLET | Refills: 0 | Status: SHIPPED | OUTPATIENT
Start: 2021-06-21 | End: 2021-07-22

## 2021-07-23 RX ORDER — CITALOPRAM 10 MG/1
10 TABLET ORAL DAILY
Qty: 30 TABLET | Refills: 0 | Status: SHIPPED | OUTPATIENT
Start: 2021-07-23 | End: 2021-08-25 | Stop reason: SDUPTHER

## 2021-07-23 NOTE — TELEPHONE ENCOUNTER
Arranged appt for 8/23/21. Called saying she needs refill today, is out of medication.   Statement Selected

## 2021-08-23 ENCOUNTER — OFFICE VISIT (OUTPATIENT)
Dept: FAMILY MEDICINE CLINIC | Facility: CLINIC | Age: 31
End: 2021-08-23

## 2021-08-23 VITALS
BODY MASS INDEX: 31.18 KG/M2 | WEIGHT: 194 LBS | DIASTOLIC BLOOD PRESSURE: 72 MMHG | HEIGHT: 66 IN | OXYGEN SATURATION: 98 % | TEMPERATURE: 97.5 F | SYSTOLIC BLOOD PRESSURE: 122 MMHG | HEART RATE: 76 BPM

## 2021-08-23 DIAGNOSIS — Z28.21 IMMUNIZATION REFUSED: ICD-10-CM

## 2021-08-23 DIAGNOSIS — Z13.220 SCREENING CHOLESTEROL LEVEL: ICD-10-CM

## 2021-08-23 DIAGNOSIS — E06.3 HYPOTHYROIDISM DUE TO HASHIMOTO'S THYROIDITIS: Primary | ICD-10-CM

## 2021-08-23 DIAGNOSIS — E03.8 HYPOTHYROIDISM DUE TO HASHIMOTO'S THYROIDITIS: Primary | ICD-10-CM

## 2021-08-23 PROCEDURE — 99214 OFFICE O/P EST MOD 30 MIN: CPT | Performed by: NURSE PRACTITIONER

## 2021-08-23 NOTE — PROGRESS NOTES
"Subjective   Crystal Anthony is a 30 y.o. female who presents for a follow up on anxiety, hashimoto's.     History of Present Illness   Anxiety--still occurs in social situations. Overall feeling better, but not able to attend son's [employer] practice secondary to anxiety. Citalopram better for anxiety than wellbutrin. Felt crazier on this than before medication.     Hashimoto's diagnosed 2018--thyroid mild enlarged with no nodules--heterogenous    Not going to get vaccinate for COVID  The following portions of the patient's history were reviewed and updated as appropriate: allergies, current medications, past family history, past medical history, past social history, past surgical history and problem list.    Review of Systems   Constitutional: Negative.  Negative for activity change and unexpected weight change.   HENT: Negative.    Eyes: Negative.  Negative for visual disturbance.   Respiratory: Negative.    Cardiovascular: Negative.  Negative for chest pain.   Gastrointestinal: Negative.  Negative for constipation and diarrhea.   Endocrine: Negative.    Genitourinary: Negative for difficulty urinating and frequency.   Musculoskeletal: Negative.    Neurological: Negative for weakness and headaches.   Hematological: Negative.    Psychiatric/Behavioral: Positive for agitation, decreased concentration and dysphoric mood. Negative for confusion, self-injury, sleep disturbance and suicidal ideas. The patient is nervous/anxious.      /72   Pulse 76   Temp 97.5 °F (36.4 °C) (Infrared)   Ht 167.6 cm (66\")   Wt 88 kg (194 lb)   SpO2 98%   BMI 31.31 kg/m²     Objective   Physical Exam  Vitals and nursing note reviewed.   Constitutional:       Appearance: She is well-developed.   Eyes:      Conjunctiva/sclera: Conjunctivae normal.      Pupils: Pupils are equal, round, and reactive to light.   Neck:      Thyroid: No thyromegaly.      Vascular: No JVD.      Trachea: No tracheal deviation.   Cardiovascular:      Rate " and Rhythm: Normal rate and regular rhythm.      Heart sounds: Normal heart sounds.   Pulmonary:      Effort: Pulmonary effort is normal.      Breath sounds: Normal breath sounds.   Abdominal:      Palpations: Abdomen is soft.      Tenderness: There is no abdominal tenderness.   Musculoskeletal:      Cervical back: Normal range of motion and neck supple.   Lymphadenopathy:      Cervical: No cervical adenopathy.   Skin:     General: Skin is warm and dry.   Neurological:      Mental Status: She is alert and oriented to person, place, and time.   Psychiatric:         Attention and Perception: She does not perceive visual hallucinations.         Mood and Affect: Mood is anxious. Affect is not labile, blunt, angry or inappropriate.         Speech: Speech normal.         Behavior: Behavior normal.         Thought Content: Thought content is paranoid and delusional. Thought content does not include homicidal or suicidal ideation. Thought content does not include homicidal or suicidal plan.         Judgment: Judgment is inappropriate.         Assessment/Plan   Problems Addressed this Visit     None      Visit Diagnoses     Hypothyroidism due to Hashimoto's thyroiditis    -  Primary    Relevant Orders    TSH    T4, Free    CBC (No Diff)    Screening cholesterol level        Relevant Orders    Lipid Panel    Comprehensive Metabolic Panel    Immunization refused          Diagnoses       Codes Comments    Hypothyroidism due to Hashimoto's thyroiditis    -  Primary ICD-10-CM: E03.8, E06.3  ICD-9-CM: 244.8, 245.2     Screening cholesterol level     ICD-10-CM: Z13.220  ICD-9-CM: V77.91     Immunization refused     ICD-10-CM: Z28.21  ICD-9-CM: V64.06         Hypothyroid--not monitored in 14 months. Discussed role of monitoring.   Screening lipids  Refused COVID vaccination--encouraged to reconsider.     This document is intended for medical expert use only. Reading of this document by patients and/or patient's family without  participating medical staff guidance may result in misinterpretation and unintended morbidity.  Any interpretation of such data is the responsibility of the patient and/or family member responsible for the patient in concert with their primary or specialist providers, not to be left for sources of online searches such as [employer], [employer] or similar queries. Relying on these approaches to knowledge may result in misinterpretation, misguided goals of care and even death should patients or family members try recommendations outside of the realm of professional medical care in a supervised way.    Please allow 3-5 business days for recommendations based on new results    Go to the ER for any possible lifethreatening symptoms such as chest pain or shortness of air.     I personally spent 22 minutes reviewing the chart before the visit, time with the patient, and time documenting the visit.

## 2021-08-24 LAB
ALBUMIN SERPL-MCNC: 4.7 G/DL (ref 3.8–4.8)
ALBUMIN/GLOB SERPL: 1.6 {RATIO} (ref 1.2–2.2)
ALP SERPL-CCNC: 61 IU/L (ref 48–121)
ALT SERPL-CCNC: 16 IU/L (ref 0–32)
AST SERPL-CCNC: 15 IU/L (ref 0–40)
BILIRUB SERPL-MCNC: 0.4 MG/DL (ref 0–1.2)
BUN SERPL-MCNC: 17 MG/DL (ref 6–20)
BUN/CREAT SERPL: 18 (ref 9–23)
CALCIUM SERPL-MCNC: 10 MG/DL (ref 8.7–10.2)
CHLORIDE SERPL-SCNC: 99 MMOL/L (ref 96–106)
CHOLEST SERPL-MCNC: 222 MG/DL (ref 100–199)
CO2 SERPL-SCNC: 22 MMOL/L (ref 20–29)
CREAT SERPL-MCNC: 0.96 MG/DL (ref 0.57–1)
ERYTHROCYTE [DISTWIDTH] IN BLOOD BY AUTOMATED COUNT: 12.3 % (ref 11.7–15.4)
GLOBULIN SER CALC-MCNC: 2.9 G/DL (ref 1.5–4.5)
GLUCOSE SERPL-MCNC: 81 MG/DL (ref 65–99)
HCT VFR BLD AUTO: 44.1 % (ref 34–46.6)
HDLC SERPL-MCNC: 82 MG/DL
HGB BLD-MCNC: 14.6 G/DL (ref 11.1–15.9)
LDLC SERPL CALC-MCNC: 126 MG/DL (ref 0–99)
MCH RBC QN AUTO: 32.1 PG (ref 26.6–33)
MCHC RBC AUTO-ENTMCNC: 33.1 G/DL (ref 31.5–35.7)
MCV RBC AUTO: 97 FL (ref 79–97)
PLATELET # BLD AUTO: 304 X10E3/UL (ref 150–450)
POTASSIUM SERPL-SCNC: 4.5 MMOL/L (ref 3.5–5.2)
PROT SERPL-MCNC: 7.6 G/DL (ref 6–8.5)
RBC # BLD AUTO: 4.55 X10E6/UL (ref 3.77–5.28)
SODIUM SERPL-SCNC: 136 MMOL/L (ref 134–144)
T4 FREE SERPL-MCNC: 1.05 NG/DL (ref 0.82–1.77)
TRIGL SERPL-MCNC: 78 MG/DL (ref 0–149)
TSH SERPL DL<=0.005 MIU/L-ACNC: 5.65 UIU/ML (ref 0.45–4.5)
VLDLC SERPL CALC-MCNC: 14 MG/DL (ref 5–40)
WBC # BLD AUTO: 11.1 X10E3/UL (ref 3.4–10.8)

## 2021-08-25 DIAGNOSIS — E06.3 HYPOTHYROIDISM DUE TO HASHIMOTO'S THYROIDITIS: ICD-10-CM

## 2021-08-25 DIAGNOSIS — E03.8 HYPOTHYROIDISM DUE TO HASHIMOTO'S THYROIDITIS: ICD-10-CM

## 2021-08-25 NOTE — PROGRESS NOTES
Please call the patient regarding her abnormal result. Thyroid not adequately replaced. Increase levothyroxine to 88 mcg daily and recheck in 8 weeks #90 0rf. Cholesterol is elevated. Work on diet

## 2021-08-26 RX ORDER — LEVOTHYROXINE SODIUM 88 UG/1
88 TABLET ORAL DAILY
Qty: 30 TABLET | Refills: 1 | Status: SHIPPED | OUTPATIENT
Start: 2021-08-26 | End: 2022-04-18

## 2021-08-26 RX ORDER — CITALOPRAM 10 MG/1
10 TABLET ORAL DAILY
Qty: 30 TABLET | Refills: 3 | Status: SHIPPED | OUTPATIENT
Start: 2021-08-26 | End: 2022-10-12 | Stop reason: SDUPTHER

## 2022-04-18 DIAGNOSIS — E06.3 HYPOTHYROIDISM DUE TO HASHIMOTO'S THYROIDITIS: ICD-10-CM

## 2022-04-18 DIAGNOSIS — E03.8 HYPOTHYROIDISM DUE TO HASHIMOTO'S THYROIDITIS: ICD-10-CM

## 2022-04-18 RX ORDER — LEVOTHYROXINE SODIUM 88 UG/1
TABLET ORAL
Qty: 30 TABLET | Refills: 0 | Status: SHIPPED | OUTPATIENT
Start: 2022-04-18 | End: 2022-10-12 | Stop reason: SDUPTHER

## 2022-08-29 ENCOUNTER — TELEPHONE (OUTPATIENT)
Dept: FAMILY MEDICINE CLINIC | Facility: CLINIC | Age: 32
End: 2022-08-29

## 2022-08-29 NOTE — TELEPHONE ENCOUNTER
Caller: Crystal Anthony    Relationship to patient: Self    Best call back number: [phone]    Chief complaint: DIARRHEA, NAUSEA, SPITTING UP ACID TYPE BILE, BLOOD WORK TO HAVE THYROID CHECKED.    Type of visit: OFFICE    Requested date: WITHIN THE NEXT FEW DAYS    If rescheduling, when is the original appointment: N/A     Additional notes:PATIENT REQUESTING AN APPT SOON BUT AS LATE IN THE AFTERNOON AS POSSIBLE.    PATIENT STATED THAT EVEN THE APPT (FIRST AVAIL) WITH [facility] TOO FAR OUT (09/13).  PATIENT HOPING TO FEEL BETTER BY THEN.

## 2022-10-11 ENCOUNTER — E-VISIT (OUTPATIENT)
Dept: FAMILY MEDICINE CLINIC | Facility: TELEHEALTH | Age: 32
End: 2022-10-11

## 2022-10-11 ENCOUNTER — TELEPHONE (OUTPATIENT)
Dept: FAMILY MEDICINE CLINIC | Facility: CLINIC | Age: 32
End: 2022-10-11

## 2022-10-11 NOTE — TELEPHONE ENCOUNTER
I called.  Not doing well;  Ran out of citalopram and needs ov;   citaloprma did ok but not good enough.   I told come in at 8:15 am 10/12/22

## 2022-10-11 NOTE — E-VISIT ESCALATED
Patient escalated   Provider Deb Veronica chose to escalate patient to another level of care because: Patient's free text comments   Additional Details: patient called, police notified for well check   Patient was sent the following message:   Based on the information you've provided us, you need to take another step to get care.   What to do now:   Provider call me   We will call you to discuss your current issue.   A provider will call you shortly to discuss your issue. Please look out for any blocked numbers.   You won't be charged for your eVisit. If you paid with a credit card, the charge will be reversed.   Chief Complaint: Anxiety, Depression, Stress   Patient introduction   Patient is 32-year-old female presenting with mood symptoms. Patient has had current symptoms for less than a year. Patient is willing to try medication as part of their treatment plan.   Warning. The following may warrant further investigation:    High risk of suicide based on the [place]-Suicide Severity Rating Scale   Patient-submitted comments explaining reason for visit: I don't want to feel this way anymore.   Patient requests a 1-day excuse note.   Has had recent unusual stress relating to home situation, family functioning, work, finances, and current news and events.   Depression screening   PHQ-9. Response options are: Not at all (0), On several days (1), More than half the days (2), or Nearly every day (3).   Over the past 2 weeks, patient has been bothered:    (3) Nearly every day by having little interest or pleasure in doing things    (3) Nearly every day by depressed mood    (2) On more than half the days by sleep disturbance    (3) Nearly every day by fatigue or lethargy    (3) Nearly every day by change in appetite    (3) Nearly every day by feelings of worthlessness or excessive guilt    (2) On more than half the days by poor concentration    (1) On several days by observable restlessness or slowness in movement     (3) Nearly every day by thoughts of hurting themselves or that they would be better off dead   The above problems have made it extremely difficult to work, function at home, or get along with other people.   Score: 23. Interpretation: 0 to 4: None to minimal. 5 to 9: Mild depression. 10 to 14: Major Depressive Disorder, Mild. 15 to 19: Major Depressive Disorder, Moderately Severe. 20 to 27: Major Depressive Disorder, Severe.   Anxiety screening   RADHA-7. Response options are: Not at all (0), On several days (1), More than half the days (2), or Nearly every day (3)   Over the past 2 weeks, patient has been bothered:    (3) Nearly every day by feeling nervous, anxious, or on edge    (2) On more than half the days by not being able to stop or control worrying    (2) On more than half the days by worrying too much about different things    (3) Nearly every day by having trouble relaxing    (3) Nearly every day by being so restless that it is hard to sit still    (3) Nearly every day by becoming easily annoyed or irritable    (1) On several days by feeling afraid, as if something awful might happen   The above problems have made it very difficult to work, function at home, or get along with other people.   Score: 17. Interpretation: 0 to 4: None to minimal. 5 to 9: Mild anxiety. 10 to 14: Moderate anxiety. 15 to 21: Severe anxiety.   Suicide risk screening   [place]-Suicide Severity Rating Scale with Triage Points (C-SSRS). 3 to 6 items.   Patient responses:    Yes to: In the past month, have you wished you were dead or wished you could go to sleep and not wake up?    Yes to: In the past month, have you actually had thoughts of killing yourself?    Yes to: Have you thought about how you might do this?    Yes to: When you thought about killing yourself, did you think this was something you might actually do? This is different from having the thoughts but knowing you would not follow through.    Yes to: Have you started  to work out the details of how to kill yourself? Do you intend to carry out this plan?    Yes, within the last 3 months to: Have you ever done anything, started to do anything, or prepared to do anything to end your life? Examples include collecting pills, getting a gun, giving away valuables, writing a will or suicide note, holding a gun but then changing your mind, cutting yourself to kill yourself, or trying to hang yourself.   Score: High risk.   Action taken based on risk:    Negative screen: Patient completed interview.    Low risk: Patient completed interview. Follow-up per provider discretion.    Moderate risk: Recommended to call [phone] or [phone] or to go to their nearest ER. Patient given option to continue with the interview if those options are not relevant at this time. Follow-up per provider discretion.    High risk: Interview terminated. Recommended to go to ER or to call [phone] or [phone].   Repetitive thoughts and behaviors screening   DSM-5 Level 1 Cross-Cutting Symptom Measure, Section X. 2 items. Response options are: Not at all (0), Rarely (1), Several days (2), More than half the days (3), or Nearly every day (4)   Over the past 2 weeks, patient has been bothered:    (1) On 1 to 2 days by unpleasant thoughts, urges, or images that repeatedly enter their mind    (0) Not at all by feeling driven to repeat certain behaviors or mental acts   Score: 1. Interpretation: 0 to 2 (with 0 to 1 on both items): Negative screen. 2 or higher (with 2 or higher on either item): Positive screen.   Cony/hypomania screening   DSM-5 Level 1 Cross-Cutting Symptom Measure, Section III. 2 items. Response options are: Not at all (0), Rarely (1), Several days (2), More than half the days (3), or Nearly every day (4)   Over the past 2 weeks, patient has been bothered:    (1) On 1 to 2 days by sleeping less than usual, but still having a lot of energy    (1) On 1 to 2 days by starting lots more projects than usual or doing more risky  things than usual   Score: 2. Interpretation: 0 to 2 (with 1 on both items): Negative screen. 2 or higher (with 2 or higher on at least 1 item): Positive screen; in-interview follow-up with Jayla Self-Rating Cony (ASRM) Scale.   Psychosis/hallucination screening   DSM-5 Level 1 Cross-Cutting Symptom Measure, Section VII. 2 items. Response options are: Not at all (0), Rarely (1), Several days (2), More than half the days (3), or Nearly every day (4)   Over the past 2 weeks, patient has been bothered:    (0) Not at all by hearing things other people could not hear    (0) Not at all by feeling that someone could hear their thoughts   Score: 0. Interpretation: 0: Negative screen. 1 or higher: Positive screen.   Substance abuse screening   DSM-5 Level 1 Cross-Cutting Symptom Measure, Section XIII. 2 items on use of tobacco, recreational drugs, or prescription medications beyond the amount prescribed or duration of prescription.   Over the past 2 weeks, patient:    (0) Did not use tobacco    (0) Did not use a recreational or prescription drug on their own   Score: 0. Interpretation: 0 is a negative screen. 1 or higher with positive response for prescription/recreational drug abuse leads to follow-up with Level 2 Cross-Cutting Symptom Measure, Section XIII. 1 or higher with positive response for tobacco use leads to tobacco cessation advice in AVS.   AUDIT-C. 3 items, shown if alcohol use reported above.   During the past year, patient:    (3) Had an alcoholic drink 2 to 3 times per week    (0) Had 1 to 2 alcoholic drinks on a typical day when they were drinking    (0) Did not have 6 or more drinks on one occasion   Score: 3. Interpretation: A score of 3 or higher in women is a positive screen for unhealthy drinking.   Comorbid/Exacerbating conditions   History of thyroid disorder.   Past mental health history   Previous diagnosis of depression, generalized anxiety disorder, and panic attacks. Regarding month and year of  first depression diagnosis, patient writes: 2020. Since initial depression diagnosis, patient has not had a period when symptoms resolved and they did not need medication.   Family history of mental health disorders   Family history of depression, generalized anxiety disorder, panic attacks, PTSD, OCD, substance use disorder, and suicide attempt.   Current mental health treatment   Patient is not currently taking medication for any mental health condition. Patient is not currently in counseling or therapy. Patient is not currently being seen by a psychiatrist and has not been seen by one in the last 2 years.   Previous mental health treatment   Has taken citalopram and bupropion in the past.   As to effectiveness of past treatment:   Patient was not satisfied with bupropion. They felt it did not help at all. Patient does not want to refill bupropion.   Patient was not satisfied with citalopram. They felt it helped some, but symptoms were still present. Patient wants to refill citalopram.   Current medications   Not taking other medications or supplements.   Medication allergies   None.   Medication contraindications   Assessment:   Patient determined to need a level of care not appropriate to be delivered through eVisit.   Plan:   Patient informed of need to seek in-person care   ----------   Electronically signed by CARLOS Ramos on 2022-10-11 at 19:32PM   ----------   Patient Interview Transcript:   Have you ever been diagnosed with any of these mental health conditions? Select all that apply.    Depression    Generalized anxiety disorder (RADHA)    Panic attacks   Not selected:    Post traumatic stress disorder (PTSD)    Obsessive-compulsive disorder (OCD)    Bipolar disorder    Schizophrenia or schizoaffective disorder    A mental health condition not listed here (specify)    None of the above   When were you first diagnosed with depression? Please specify the month and year, or your best estimate, as  MM/YYYY.    2020   Since you were first diagnosed with depression, has there been a time when your symptoms went away completely and you didn't need to take medication? Select one.    No   Not selected:    Yes   Are you currently taking medication for any mental health condition? Select one.    No   Not selected:    Yes   Have you taken medication for any mental health condition in the past? Select one.    Yes   Not selected:    No   Which medications have you taken in the past for your mental health condition(s)? Select all that apply.    Celexa (citalopram)    Wellbutrin SR, Wellbutrin XL, Forfivo XL, or Aplenzin (bupropion)   Not selected:    Atarax or Vistaril (hydroxyzine)    BuSpar (buspirone)    Cymbalta or Drizalma Sprinkle (duloxetine)    Effexor or Effexor XR (venlafaxine)    Lexapro (escitalopram)    Paxil, Paxil CR, or Pexeva (paroxetine)    Pristiq (desvenlafaxine)    Prozac (fluoxetine)    Remeron (mirtazapine)    Trazodone    Zoloft (sertraline)    Other (specify medication and whether you were satisfied with it)   I'm satisfied with Celexa (citalopram)    No   Not selected:    Yes   I want to refill/restart    Yes   Not selected:    No   Why were you unsatisfied with Celexa (citalopram)? Select all that apply.    It helps some, but I still have bothersome symptoms   Not selected:    It doesn't help my symptoms at all    I don't like the side effects    It's too expensive    None of the above   I'm satisfied with Wellbutrin SR, Wellbutrin XL, Forfivo XL, or Aplenzin    No   Not selected:    Yes   I want to refill/restart    No   Not selected:    Yes   Why were you unsatisfied with Wellbutrin SR, Wellbutrin XL, Forfivo XL, or Aplenzin (bupropion)? Select all that apply.    It doesn't help my symptoms at all   Not selected:    It helps some, but I still have bothersome symptoms    I don't like the side effects    It's too expensive    None of the above   Have you recently experienced unusual stress from  any of these? Select all that apply.    Home situation    Family    Work    Finances    Current news and events   Not selected:    Personal relationships    Something related to COVID-19    None of the above   Are you currently in counseling or therapy? Select one.    No   Not selected:    Yes   Are you currently being seen by a psychiatrist, or have you been seen by a psychiatrist in the last 2 years? Select one.    No   Not selected:    Yes, currently    Yes, within the last 2 years   Do any of these apply to you? Select all that apply.    None of the above   Not selected:    I'm pregnant    I've given birth in the past 12 months    I'm breastfeeding   Do you have any of these medical conditions? Scroll to see all options. Select all that apply.    Thyroid disorder   Not selected:    Asthma    Cancer    Chronic pain    Congestive heart failure    Coronary artery disease (blocked arteries in the heart)    Diabetes    Epilepsy    High blood pressure    Inflammatory arthritis    Kidney disease or history of kidney function problems    Lupus (SLE)    Multiple sclerosis    Parkinson disease    Viral hepatitis    None of the above   Has anyone in your family had any of these? Select all that apply.    Depression    Generalized anxiety disorder (RADHA)    Panic attacks    Post traumatic stress disorder (PTSD)    Obsessive-compulsive disorder (OCD)    Drug or alcohol addiction (substance use disorder)    Attempted suicide   Not selected:    Bipolar disorder    Schizophrenia or schizoaffective disorder     by suicide    No, not that I know of   1. Over the past 2 weeks, how often have you been bothered by: Having little interest or pleasure in doing things Select one.    Nearly every day   Not selected:    Not at all    Several days    More than half the days   2. Over the past 2 weeks, how often have you been bothered by: Feeling down, depressed, or hopeless Select one.    Nearly every day   Not selected:    Not at  all    Several days    More than half the days   3. Over the past 2 weeks, how often have you been bothered by: Trouble falling or staying asleep, or sleeping too much Select one.    More than half the days   Not selected:    Not at all    Several days    Nearly every day   4. Over the past 2 weeks, how often have you been bothered by: Feeling tired or having little energy Select one.    Nearly every day   Not selected:    Not at all    Several days    More than half the days   5. Over the past 2 weeks, how often have you been bothered by: Poor appetite or overeating Select one.    Nearly every day   Not selected:    Not at all    Several days    More than half the days   6. Over the past 2 weeks, how often have you been bothered by: Feeling bad about yourself, that you're a failure, or that you've let yourself or friends and family down Select one.    Nearly every day   Not selected:    Not at all    Several days    More than half the days   7. Over the past 2 weeks, how often have you been bothered by: Trouble concentrating on things like watching TV or reading the news Select one.    More than half the days   Not selected:    Not at all    Several days    Nearly every day   8. Over the past 2 weeks, how often have you been bothered by: Moving or speaking so slowly that other people could have noticed OR Being so fidgety or restless that you have been moving around a lot more than usual Select one.    Several days   Not selected:    Not at all    More than half the days    Nearly every day   9. Over the past 2 weeks, how often have you been bothered by: Thoughts that you'd be better off dead or thoughts of hurting yourself Select one.    Nearly every day   Not selected:    Not at all    Several days    More than half the days   How difficult have these problems made it for you to work, take care of things at home, or get along with other people? Select one.    Extremely difficult   Not selected:    Not difficult  at all    Somewhat difficult    Very difficult   We want to make sure you're safe, so we need to ask more about your thoughts of hurting yourself or thinking you'd be better off dead. In the past month, have you wished you were dead or wished you could go to sleep and not wake up? Select one.    Yes   Not selected:    No   In the past month, have you actually had thoughts of killing yourself? Select one.    Yes   Not selected:    No   Have you been thinking about how you might do this? Select one.    Yes   Not selected:    No   When you thought about killing yourself, did you think this was something you might actually do? This is different from having the thoughts but knowing you wouldn't do anything about it. Select one.    Yes   Not selected:    No   Have you started to work out or already worked out the details of how to kill yourself? Do you intend to carry out this plan? Select one.    Yes   Not selected:    No   Have you ever done anything, started to do anything, or prepared to do anything to end your life? Examples include: - Collected pills - Got a gun - Gave away valuables - Wrote a will or suicide note - Held a gun but then changed your mind - Cut yourself to kill yourself - Tried to hang yourself Select one.    Yes, within the last 3 months   Not selected:    No    Yes, more than 3 months ago   1. Over the past 2 weeks, how often have you been bothered by: Feeling nervous, anxious, or on edge? Select one.    Nearly every day   Not selected:    Not at all    Several days    More than half the days   2. Over the past 2 weeks, how often have you been bothered by: Not being able to stop or control worrying? Select one.    More than half the days   Not selected:    Not at all    Several days    Nearly every day   3. Over the past 2 weeks, how often have you been bothered by: Worrying too much about different things? Select one.    More than half the days   Not selected:    Not at all    Several days     Nearly every day   4. Over the past 2 weeks, how often have you been bothered by: Having trouble relaxing? Select one.    Nearly every day   Not selected:    Not at all    Several days    More than half the days   5. Over the past 2 weeks, how often have you been bothered by: Being so restless that it's hard to sit still? Select one.    Nearly every day   Not selected:    Not at all    Several days    More than half the days   6. Over the past 2 weeks, how often have you been bothered by: Becoming easily annoyed or irritable? Select one.    Nearly every day   Not selected:    Not at all    Several days    More than half the days   7. Over the past 2 weeks, how often have you been bothered by: Feeling afraid, as if something awful might happen? Select one.    Several days   Not selected:    Not at all    More than half the days    Nearly every day   How difficult have these symptoms made it for you to do your work, take care of things at home, or get along with other people? Select one.    Very difficult   Not selected:    Not difficult at all    Somewhat difficult    Extremely difficult   Over the past 2 weeks, how often have you been bothered by: Sleeping less than usual, but still having a lot of energy? Select one.    1 to 2 days   Not selected:    Not at all    Several days    More than half the days    Nearly every day   Over the past 2 weeks, how often have you been bothered by: Starting lots more projects than usual or doing more risky things than usual? Select one.    1 to 2 days   Not selected:    Not at all    Several days    More than half the days    Nearly every day   Over the past 2 weeks, how often have you been bothered by: Hearing things other people couldn't hear, such as voices even when no one was around? Select one.    Not at all   Not selected:    1 to 2 days    Several days    More than half the days    Nearly every day   Over the past 2 weeks, how often have you been bothered by: Feeling  "that someone could hear your thoughts, or that you could hear what another person was thinking? Select one.    Not at all   Not selected:    1 to 2 days    Several days    More than half the days    Nearly every day   Over the past 2 weeks, how often have you been bothered by: Unpleasant thoughts, urges, or images that repeatedly enter your mind? Select one.    1 to 2 days   Not selected:    Not at all    Several days    More than half the days    Nearly every day   Over the past 2 weeks, how often have you been bothered by: Feeling driven to perform certain behaviors or mental acts over and over again? Select one.    Not at all   Not selected:    1 to 2 days    Several days    More than half the days    Nearly every day   In the past year, how often did you have a drink containing alcohol? Select one.    2 to 3 times per week   Not selected:    Never    Monthly or less    2 to 4 times per month    4 or more times per week   In the past year, how many drinks did you have on a typical day when you were drinking? Select one.    1 or 2   Not selected:    3 or 4    5 or 6    7 to 9    10 or more   In the past year, how often did you have 6 or more drinks on one occasion? Select one.    Never   Not selected:    Less than monthly    Monthly    Weekly    Daily or almost daily   Over the past 2 weeks, how often have you: Smoked any cigarettes, smoked a cigar or pipe, or used snuff or chewing tobacco? Select one.    Not at all   Not selected:    1 to 2 days    Several days    More than half the days    Nearly every day   Over the past 2 weeks, how often did you use any of these medicines on your own? \"On your own\" means without a doctor's prescription, or more than prescribed, or longer than prescribed. - Prescription painkillers, such as Vicodin - Stimulants, such as Ritalin or Adderall - Sedatives or tranquilizers, such as sleeping pills or Valium - Marijuana - Cocaine or crack - Club drugs, such as Ecstasy - " "Hallucinogens, such as LSD - Heroin - Inhalants or solvents, such as glue - Methamphetamines, such as speed Select one.    Not at all   Not selected:    1 to 2 days    Several days    More than half the days    Nearly every day   Think about all of the symptoms you've shared with us today. How long have you been feeling this way? Select one.    Less than a year   Not selected:    More than a year    I'm not sure   These last few questions help us make sure your treatment plan is safe for you. Do you have any of these conditions? Select all that apply.    None of these   Not selected:    Uncorrected or persistent electrolyte abnormalities, such as potassium, sodium, calcium or magnesium    QT prolongation    Congenital long QT syndrome (LQTS)    Ventricular arrhythmias, such as ventricular fibrillation or ventricular tachycardia    Bradycardia (low heart rate)    Recent heart attack    Congestive heart failure (CHF)   Do any of these apply to you now or in the recent past? \"Cold turkey\" here means stopping a medication suddenly rather than slowly taking lower and lower doses until you're off the medication. Select all that apply.    None of these   Not selected:    Seizure disorder    Bulimia or anorexia    Liver disease    Alcohol abuse    Stopped using alcohol \"cold turkey\"    Stopped using a sedative \"cold turkey\"    Stopped using an anti-seizure drug \"cold turkey\"    Stopped using a benzodiazepine drug (Klonopin, Valium, Ativan, Xanax) \"cold turkey\"   Do any of the following apply to you? Select all that apply.    None of these   Not selected:    I'm currently taking pimozide    I'm currently taking thioridazine    I've taken an MAO inhibitor in the last 14 days    I've taken linezolid or IV methylene blue in the last 14 days   Are you taking any other medications, vitamins, or supplements? Select one.    No   Not selected:    Yes   Have you ever had an allergic or bad reaction to any medication? Select one.   "  No   Not selected:    Yes   If medication is recommended as part of your treatment plan, is that something you're willing to try? Select one.    Yes   Not selected:    No   Do you need a doctor's note? A doctor's note confirms that you received care today and states when you can return to school or work. It does not contain information about your diagnosis or treatment plan. Your provider will make the final decision on whether to give you a doctor's note. Doctor's notes CANNOT be backdated. Select one.    Today only (1 day)   Not selected:    Today and tomorrow (2 days)    3 days    No   What is the main reason you're taking this interview today?    I don't want to feel this way anymore   ----------   Medical history   Medical history data does not currently exist for this patient.

## 2022-10-11 NOTE — TELEPHONE ENCOUNTER
PT HAVING A LOT OF ANXIETY AND DEPRESSION AND CRYING NOT ABLE TO WORK OR FUNCTION AT WORK WHEN ASKED IF SHE FEELS LIKE SHE IS GOING TO HURT OR HERSELF OR OTHERS SHE SAID SHE HAS THOUGHT ABOUT IT VERY EMOTIONAL ON PHONE WANTS TO BE SEEN PLEASE ADVISE

## 2022-10-12 ENCOUNTER — OFFICE VISIT (OUTPATIENT)
Dept: FAMILY MEDICINE CLINIC | Facility: CLINIC | Age: 32
End: 2022-10-12

## 2022-10-12 VITALS
DIASTOLIC BLOOD PRESSURE: 80 MMHG | HEIGHT: 65 IN | BODY MASS INDEX: 48.82 KG/M2 | WEIGHT: 293 LBS | SYSTOLIC BLOOD PRESSURE: 102 MMHG | OXYGEN SATURATION: 97 % | HEART RATE: 85 BPM

## 2022-10-12 DIAGNOSIS — E06.3 HASHIMOTO'S THYROIDITIS: ICD-10-CM

## 2022-10-12 DIAGNOSIS — F32.2 CURRENT SEVERE EPISODE OF MAJOR DEPRESSIVE DISORDER WITHOUT PSYCHOTIC FEATURES WITHOUT PRIOR EPISODE: ICD-10-CM

## 2022-10-12 DIAGNOSIS — E55.9 VITAMIN D DEFICIENCY: ICD-10-CM

## 2022-10-12 DIAGNOSIS — R53.83 OTHER FATIGUE: ICD-10-CM

## 2022-10-12 DIAGNOSIS — E03.8 HYPOTHYROIDISM DUE TO HASHIMOTO'S THYROIDITIS: ICD-10-CM

## 2022-10-12 DIAGNOSIS — E06.3 HYPOTHYROIDISM DUE TO HASHIMOTO'S THYROIDITIS: ICD-10-CM

## 2022-10-12 DIAGNOSIS — F41.0 PANIC DISORDER: ICD-10-CM

## 2022-10-12 DIAGNOSIS — F41.1 GAD (GENERALIZED ANXIETY DISORDER): Primary | ICD-10-CM

## 2022-10-12 DIAGNOSIS — E78.5 DYSLIPIDEMIA: ICD-10-CM

## 2022-10-12 PROBLEM — E03.9 HYPOTHYROIDISM: Status: ACTIVE | Noted: 2022-08-31

## 2022-10-12 PROCEDURE — 99214 OFFICE O/P EST MOD 30 MIN: CPT | Performed by: FAMILY MEDICINE

## 2022-10-12 RX ORDER — LEVOTHYROXINE SODIUM 88 UG/1
88 TABLET ORAL DAILY
Qty: 90 TABLET | Refills: 3 | Status: SHIPPED | OUTPATIENT
Start: 2022-10-12 | End: 2022-10-25

## 2022-10-12 RX ORDER — CITALOPRAM 10 MG/1
TABLET ORAL
Qty: 14 TABLET | Refills: 0 | Status: SHIPPED | OUTPATIENT
Start: 2022-10-12

## 2022-10-12 RX ORDER — HYDROXYZINE HYDROCHLORIDE 10 MG/1
10 TABLET, FILM COATED ORAL 3 TIMES DAILY PRN
Qty: 90 TABLET | Refills: 1 | Status: SHIPPED | OUTPATIENT
Start: 2022-10-12 | End: 2022-11-22 | Stop reason: SDUPTHER

## 2022-10-12 RX ORDER — CITALOPRAM 20 MG/1
20 TABLET ORAL DAILY
Qty: 90 TABLET | Refills: 1 | Status: SHIPPED | OUTPATIENT
Start: 2022-10-12

## 2022-10-12 NOTE — PROGRESS NOTES
Subjective   Crystal Anthony is a 32 y.o. female. Presents today for   Chief Complaint   Patient presents with   • Depression   • Anxiety   • Hypothyroidism       History of Present Illness  Patient her efor f/u;  Lost to f/u;  Had choley 1 month ago at [facility];   Has severe anxiety, panic attacks and major depression;  Off all thyroid medications and feeling fatigued, myalgias and memory loss; With panic attacks cp/dyspnea;  Not functioning well.  Was on citalopram that helped some anxiety;        Review of Systems   Constitutional: Positive for fatigue. Negative for chills and fever.   Respiratory: Positive for shortness of breath.    Cardiovascular: Positive for chest pain.   Gastrointestinal: Negative for abdominal pain.   Musculoskeletal: Positive for myalgias.   Psychiatric/Behavioral: Positive for sleep disturbance. Negative for self-injury and suicidal ideas.       Patient Active Problem List   Diagnosis   • Hashimoto's thyroiditis   • Calcific Achilles tendonitis   • Good's deformity   • Plantar fasciitis   • Hypothyroidism       Social History     Socioeconomic History   • Marital status:    Tobacco Use   • Smoking status: Never   • Smokeless tobacco: Never   Substance and Sexual Activity   • Alcohol use: Yes     Alcohol/week: 1.0 standard drink     Types: 1 Glasses of wine per week     Comment: weekends   • Drug use: Never   • Sexual activity: Yes     Partners: Male     Birth control/protection: OCP     Comment:  has had vasectomy       No Known Allergies    Current Outpatient Medications on File Prior to Visit   Medication Sig Dispense Refill   • fexofenadine-pseudoephedrine (Allegra-D Allergy & Congestion) 180-240 MG per 24 hr tablet Take 1 tablet by mouth Daily. 90 tablet 1   • [DISCONTINUED] citalopram (CeleXA) 10 MG tablet Take 1 tablet by mouth Daily. 30 tablet 3   • [DISCONTINUED] levothyroxine (SYNTHROID, LEVOTHROID) 88 MCG tablet TAKE ONE TABLET BY MOUTH DAILY 30 tablet 0     No  "current facility-administered medications on file prior to visit.       Objective   Vitals:    10/12/22 0819   BP: 102/80   Pulse: 85   SpO2: 97%   Weight: (!) 212 kg (467 lb 6 oz)   Height: 165.1 cm (65\")     Body mass index is 77.78 kg/m².    Physical Exam  Vitals and nursing note reviewed.   Constitutional:       Appearance: She is well-developed.   HENT:      Head: Normocephalic and atraumatic.   Neck:      Thyroid: No thyromegaly.      Vascular: No JVD.   Cardiovascular:      Rate and Rhythm: Normal rate and regular rhythm.      Heart sounds: Normal heart sounds. No murmur heard.    No friction rub. No gallop.   Pulmonary:      Effort: Pulmonary effort is normal. No respiratory distress.      Breath sounds: Normal breath sounds. No wheezing or rales.   Abdominal:      General: Bowel sounds are normal. There is no distension.      Palpations: Abdomen is soft.      Tenderness: There is no abdominal tenderness. There is no guarding or rebound.   Musculoskeletal:      Cervical back: Neck supple.   Skin:     General: Skin is warm and dry.   Neurological:      Mental Status: She is alert.   Psychiatric:         Behavior: Behavior normal.         Assessment & Plan   Diagnoses and all orders for this visit:    1. RADHA (generalized anxiety disorder) (Primary)  -     citalopram (CeleXA) 10 MG tablet; 1 po daily x 14 days then go to 20mg  Dispense: 14 tablet; Refill: 0  -     citalopram (CeleXA) 20 MG tablet; Take 1 tablet by mouth Daily.  Dispense: 90 tablet; Refill: 1    2. Current severe episode of major depressive disorder without psychotic features without prior episode (HCC)  -     citalopram (CeleXA) 10 MG tablet; 1 po daily x 14 days then go to 20mg  Dispense: 14 tablet; Refill: 0  -     citalopram (CeleXA) 20 MG tablet; Take 1 tablet by mouth Daily.  Dispense: 90 tablet; Refill: 1    3. Hashimoto's thyroiditis  -     TSH    4. Hypothyroidism due to Hashimoto's thyroiditis  -     levothyroxine (SYNTHROID, " LEVOTHROID) 88 MCG tablet; Take 1 tablet by mouth Daily.  Dispense: 90 tablet; Refill: 3    5. Other fatigue  -     Comprehensive Metabolic Panel  -     CBC & Differential  -     TSH  -     Vitamin B12    6. Dyslipidemia  -     Lipid Panel    7. Vitamin D deficiency  -     Vitamin D 25 Hydroxy    8. Panic disorder  -     hydrOXYzine (ATARAX) 10 MG tablet; Take 1 tablet by mouth 3 (Three) Times a Day As Needed for Anxiety.  Dispense: 90 tablet; Refill: 1    due check lipids and vitmain D  Restart thyroi dmed and will initiate celexa as very depressed;  Ok atarax for prn anxiety  Seek care immediately if any thoughts self harm or not doing well         -Follow up: 6 weeks and prn

## 2022-10-13 LAB
25(OH)D3+25(OH)D2 SERPL-MCNC: 39.8 NG/ML (ref 30–100)
ALBUMIN SERPL-MCNC: 4 G/DL (ref 3.5–5.2)
ALBUMIN/GLOB SERPL: 1.8 G/DL
ALP SERPL-CCNC: 59 U/L (ref 39–117)
ALT SERPL-CCNC: 19 U/L (ref 1–33)
AST SERPL-CCNC: 12 U/L (ref 1–32)
BASOPHILS # BLD AUTO: 0.06 10*3/MM3 (ref 0–0.2)
BASOPHILS NFR BLD AUTO: 0.6 % (ref 0–1.5)
BILIRUB SERPL-MCNC: 0.2 MG/DL (ref 0–1.2)
BUN SERPL-MCNC: 12 MG/DL (ref 6–20)
BUN/CREAT SERPL: 14.5 (ref 7–25)
CALCIUM SERPL-MCNC: 9.4 MG/DL (ref 8.6–10.5)
CHLORIDE SERPL-SCNC: 105 MMOL/L (ref 98–107)
CHOLEST SERPL-MCNC: 173 MG/DL (ref 0–200)
CO2 SERPL-SCNC: 24.2 MMOL/L (ref 22–29)
CREAT SERPL-MCNC: 0.83 MG/DL (ref 0.57–1)
EGFRCR SERPLBLD CKD-EPI 2021: 95 ML/MIN/1.73
EOSINOPHIL # BLD AUTO: 0.19 10*3/MM3 (ref 0–0.4)
EOSINOPHIL NFR BLD AUTO: 1.9 % (ref 0.3–6.2)
ERYTHROCYTE [DISTWIDTH] IN BLOOD BY AUTOMATED COUNT: 12 % (ref 12.3–15.4)
GLOBULIN SER CALC-MCNC: 2.2 GM/DL
GLUCOSE SERPL-MCNC: 94 MG/DL (ref 65–99)
HCT VFR BLD AUTO: 39.4 % (ref 34–46.6)
HDLC SERPL-MCNC: 67 MG/DL (ref 40–60)
HGB BLD-MCNC: 13.2 G/DL (ref 12–15.9)
IMM GRANULOCYTES # BLD AUTO: 0.05 10*3/MM3 (ref 0–0.05)
IMM GRANULOCYTES NFR BLD AUTO: 0.5 % (ref 0–0.5)
LDLC SERPL CALC-MCNC: 93 MG/DL (ref 0–100)
LYMPHOCYTES # BLD AUTO: 2.16 10*3/MM3 (ref 0.7–3.1)
LYMPHOCYTES NFR BLD AUTO: 21.5 % (ref 19.6–45.3)
MCH RBC QN AUTO: 31.7 PG (ref 26.6–33)
MCHC RBC AUTO-ENTMCNC: 33.5 G/DL (ref 31.5–35.7)
MCV RBC AUTO: 94.7 FL (ref 79–97)
MONOCYTES # BLD AUTO: 0.96 10*3/MM3 (ref 0.1–0.9)
MONOCYTES NFR BLD AUTO: 9.6 % (ref 5–12)
NEUTROPHILS # BLD AUTO: 6.61 10*3/MM3 (ref 1.7–7)
NEUTROPHILS NFR BLD AUTO: 65.9 % (ref 42.7–76)
NRBC BLD AUTO-RTO: 0 /100 WBC (ref 0–0.2)
PLATELET # BLD AUTO: 278 10*3/MM3 (ref 140–450)
POTASSIUM SERPL-SCNC: 4.7 MMOL/L (ref 3.5–5.2)
PROT SERPL-MCNC: 6.2 G/DL (ref 6–8.5)
RBC # BLD AUTO: 4.16 10*6/MM3 (ref 3.77–5.28)
SODIUM SERPL-SCNC: 139 MMOL/L (ref 136–145)
TRIGL SERPL-MCNC: 70 MG/DL (ref 0–150)
TSH SERPL DL<=0.005 MIU/L-ACNC: 4.76 UIU/ML (ref 0.27–4.2)
VIT B12 SERPL-MCNC: 460 PG/ML (ref 211–946)
VLDLC SERPL CALC-MCNC: 13 MG/DL (ref 5–40)
WBC # BLD AUTO: 10.03 10*3/MM3 (ref 3.4–10.8)

## 2022-10-23 NOTE — PROGRESS NOTES
Call results to patient.  Thyroid improved, but still slightly underactive.  I would change levothyroxine 88 mcg 1 po daily to 100mcg 1 po daily and check TSH in 6 weeks dx hypothyroidism  Cholesterol excellent  B12 normal  Blood counts are normal  Kidney and liver function are normal

## 2022-10-25 DIAGNOSIS — E03.8 HYPOTHYROIDISM DUE TO HASHIMOTO'S THYROIDITIS: ICD-10-CM

## 2022-10-25 DIAGNOSIS — E06.3 HYPOTHYROIDISM DUE TO HASHIMOTO'S THYROIDITIS: ICD-10-CM

## 2022-10-25 RX ORDER — LEVOTHYROXINE SODIUM 0.1 MG/1
100 TABLET ORAL DAILY
Qty: 90 TABLET | Refills: 0 | Status: SHIPPED | OUTPATIENT
Start: 2022-10-25

## 2022-11-22 ENCOUNTER — OFFICE VISIT (OUTPATIENT)
Dept: FAMILY MEDICINE CLINIC | Facility: CLINIC | Age: 32
End: 2022-11-22

## 2022-11-22 VITALS
HEIGHT: 65 IN | OXYGEN SATURATION: 100 % | BODY MASS INDEX: 33.82 KG/M2 | HEART RATE: 90 BPM | DIASTOLIC BLOOD PRESSURE: 80 MMHG | SYSTOLIC BLOOD PRESSURE: 120 MMHG | WEIGHT: 203 LBS

## 2022-11-22 DIAGNOSIS — E03.9 ACQUIRED HYPOTHYROIDISM: ICD-10-CM

## 2022-11-22 DIAGNOSIS — F41.0 PANIC DISORDER: ICD-10-CM

## 2022-11-22 DIAGNOSIS — F41.1 GAD (GENERALIZED ANXIETY DISORDER): Primary | ICD-10-CM

## 2022-11-22 PROCEDURE — 99214 OFFICE O/P EST MOD 30 MIN: CPT | Performed by: FAMILY MEDICINE

## 2022-11-22 RX ORDER — HYDROXYZINE HYDROCHLORIDE 10 MG/1
10 TABLET, FILM COATED ORAL 3 TIMES DAILY PRN
Qty: 90 TABLET | Refills: 1 | Status: SHIPPED | OUTPATIENT
Start: 2022-11-22

## 2022-11-22 NOTE — PROGRESS NOTES
Subjective   Crystal Anthony is a 32 y.o. female. Presents today for   Chief Complaint   Patient presents with   • Anxiety   • Hypothyroidism       Anxiety  Presents for follow-up visit. Symptoms include depressed mood and nervous/anxious behavior. Symptoms occur most days. The severity of symptoms is moderate. The quality of sleep is fair. Nighttime awakenings: occasional.     Compliance with medications is %.   Hypothyroidism  This is a chronic problem. The current episode started more than 1 year ago. The problem occurs constantly. The problem has been gradually improving. Treatments tried: last ov adjusted dose, due recheck.       Review of Systems   Psychiatric/Behavioral: The patient is nervous/anxious.        Patient Active Problem List   Diagnosis   • Hashimoto's thyroiditis   • Calcific Achilles tendonitis   • Good's deformity   • Plantar fasciitis   • Hypothyroidism       Social History     Socioeconomic History   • Marital status:    Tobacco Use   • Smoking status: Never   • Smokeless tobacco: Never   Substance and Sexual Activity   • Alcohol use: Yes     Alcohol/week: 1.0 standard drink     Types: 1 Glasses of wine per week     Comment: weekends   • Drug use: Never   • Sexual activity: Yes     Partners: Male     Birth control/protection: OCP     Comment:  has had vasectomy       No Known Allergies    Current Outpatient Medications on File Prior to Visit   Medication Sig Dispense Refill   • citalopram (CeleXA) 10 MG tablet 1 po daily x 14 days then go to 20mg 14 tablet 0   • citalopram (CeleXA) 20 MG tablet Take 1 tablet by mouth Daily. 90 tablet 1   • fexofenadine-pseudoephedrine (Allegra-D Allergy & Congestion) 180-240 MG per 24 hr tablet Take 1 tablet by mouth Daily. 90 tablet 1   • hydrOXYzine (ATARAX) 10 MG tablet Take 1 tablet by mouth 3 (Three) Times a Day As Needed for Anxiety. 90 tablet 1   • levothyroxine (SYNTHROID, LEVOTHROID) 100 MCG tablet Take 1 tablet by mouth Daily.  "90 tablet 0     No current facility-administered medications on file prior to visit.       Objective   Vitals:    11/22/22 0829   BP: 130/98   Pulse: 90   SpO2: 100%   Weight: 92.1 kg (203 lb)   Height: 165.1 cm (65\")     Body mass index is 33.78 kg/m².    Physical Exam  Vitals and nursing note reviewed.   Constitutional:       Appearance: Normal appearance. She is not toxic-appearing or diaphoretic.   HENT:      Head: Normocephalic and atraumatic.   Musculoskeletal:      Cervical back: Neck supple.   Skin:     General: Skin is warm and dry.      Capillary Refill: Capillary refill takes less than 2 seconds.   Neurological:      Mental Status: She is alert.   Psychiatric:         Mood and Affect: Mood normal.         Behavior: Behavior normal.         Assessment & Plan   Diagnoses and all orders for this visit:    1. RADHA (generalized anxiety disorder) (Primary)    2. Acquired hypothyroidism  -     TSH    continue citalopram  Recheck TSH in 2 weeks, continue levothyroxine         -Follow up: 3 months and prn   "